# Patient Record
Sex: MALE | Race: BLACK OR AFRICAN AMERICAN | NOT HISPANIC OR LATINO | URBAN - METROPOLITAN AREA
[De-identification: names, ages, dates, MRNs, and addresses within clinical notes are randomized per-mention and may not be internally consistent; named-entity substitution may affect disease eponyms.]

---

## 2023-03-21 LAB
INR IN PPP BY COAGULATION ASSAY: 2.5 (ref 0.9–1.1)
PROTHROMBIN TIME (PT) IN PPP BY COAGULATION ASSAY: 29.7 SEC (ref 9.8–13.4)

## 2023-05-10 LAB
INR IN PPP BY COAGULATION ASSAY: 1.6 (ref 0.9–1.1)
PROTHROMBIN TIME (PT) IN PPP BY COAGULATION ASSAY: 18.7 SEC (ref 9.8–13.4)

## 2023-08-16 LAB
INR IN PPP BY COAGULATION ASSAY: 2.2 (ref 0.9–1.1)
PROTHROMBIN TIME (PT) IN PPP BY COAGULATION ASSAY: 24.6 SEC (ref 9.8–12.8)

## 2023-09-13 LAB
ALANINE AMINOTRANSFERASE (SGPT) (U/L) IN SER/PLAS: 25 U/L (ref 10–52)
ALBUMIN (G/DL) IN SER/PLAS: 3.8 G/DL (ref 3.4–5)
ALKALINE PHOSPHATASE (U/L) IN SER/PLAS: 101 U/L (ref 33–136)
ANION GAP IN SER/PLAS: 20 MMOL/L (ref 10–20)
ASPARTATE AMINOTRANSFERASE (SGOT) (U/L) IN SER/PLAS: 21 U/L (ref 9–39)
BASOPHILS (10*3/UL) IN BLOOD BY AUTOMATED COUNT: 0.05 X10E9/L (ref 0–0.1)
BASOPHILS/100 LEUKOCYTES IN BLOOD BY AUTOMATED COUNT: 0.6 % (ref 0–2)
BILIRUBIN TOTAL (MG/DL) IN SER/PLAS: 0.8 MG/DL (ref 0–1.2)
CALCIDIOL (25 OH VITAMIN D3) (NG/ML) IN SER/PLAS: 70 NG/ML
CALCIUM (MG/DL) IN SER/PLAS: 9.9 MG/DL (ref 8.6–10.6)
CARBON DIOXIDE, TOTAL (MMOL/L) IN SER/PLAS: 21 MMOL/L (ref 21–32)
CHLORIDE (MMOL/L) IN SER/PLAS: 103 MMOL/L (ref 98–107)
CHOLESTEROL (MG/DL) IN SER/PLAS: 99 MG/DL (ref 0–199)
CHOLESTEROL IN HDL (MG/DL) IN SER/PLAS: 32.1 MG/DL
CHOLESTEROL/HDL RATIO: 3.1
COBALAMIN (VITAMIN B12) (PG/ML) IN SER/PLAS: 303 PG/ML (ref 211–911)
CREATININE (MG/DL) IN SER/PLAS: 1.94 MG/DL (ref 0.5–1.3)
EOSINOPHILS (10*3/UL) IN BLOOD BY AUTOMATED COUNT: 0.28 X10E9/L (ref 0–0.7)
EOSINOPHILS/100 LEUKOCYTES IN BLOOD BY AUTOMATED COUNT: 3.4 % (ref 0–6)
ERYTHROCYTE DISTRIBUTION WIDTH (RATIO) BY AUTOMATED COUNT: 13.8 % (ref 11.5–14.5)
ERYTHROCYTE MEAN CORPUSCULAR HEMOGLOBIN CONCENTRATION (G/DL) BY AUTOMATED: 32.2 G/DL (ref 32–36)
ERYTHROCYTE MEAN CORPUSCULAR VOLUME (FL) BY AUTOMATED COUNT: 98 FL (ref 80–100)
ERYTHROCYTES (10*6/UL) IN BLOOD BY AUTOMATED COUNT: 4.43 X10E12/L (ref 4.5–5.9)
ESTIMATED AVERAGE GLUCOSE FOR HBA1C: 160 MG/DL
GFR MALE: 37 ML/MIN/1.73M2
GLUCOSE (MG/DL) IN SER/PLAS: 120 MG/DL (ref 74–99)
HEMATOCRIT (%) IN BLOOD BY AUTOMATED COUNT: 43.2 % (ref 41–52)
HEMOGLOBIN (G/DL) IN BLOOD: 13.9 G/DL (ref 13.5–17.5)
HEMOGLOBIN A1C/HEMOGLOBIN TOTAL IN BLOOD: 7.2 %
IMMATURE GRANULOCYTES/100 LEUKOCYTES IN BLOOD BY AUTOMATED COUNT: 0.2 % (ref 0–0.9)
INR IN PPP BY COAGULATION ASSAY: 2.5 (ref 0.9–1.1)
LDL: 54 MG/DL (ref 0–99)
LEUKOCYTES (10*3/UL) IN BLOOD BY AUTOMATED COUNT: 8.2 X10E9/L (ref 4.4–11.3)
LYMPHOCYTES (10*3/UL) IN BLOOD BY AUTOMATED COUNT: 2.73 X10E9/L (ref 1.2–4.8)
LYMPHOCYTES/100 LEUKOCYTES IN BLOOD BY AUTOMATED COUNT: 33.5 % (ref 13–44)
MONOCYTES (10*3/UL) IN BLOOD BY AUTOMATED COUNT: 0.75 X10E9/L (ref 0.1–1)
MONOCYTES/100 LEUKOCYTES IN BLOOD BY AUTOMATED COUNT: 9.2 % (ref 2–10)
NEUTROPHILS (10*3/UL) IN BLOOD BY AUTOMATED COUNT: 4.32 X10E9/L (ref 1.2–7.7)
NEUTROPHILS/100 LEUKOCYTES IN BLOOD BY AUTOMATED COUNT: 53.1 % (ref 40–80)
NRBC (PER 100 WBCS) BY AUTOMATED COUNT: 0 /100 WBC (ref 0–0)
PLATELETS (10*3/UL) IN BLOOD AUTOMATED COUNT: 223 X10E9/L (ref 150–450)
POTASSIUM (MMOL/L) IN SER/PLAS: 4.5 MMOL/L (ref 3.5–5.3)
PROTEIN TOTAL: 7.2 G/DL (ref 6.4–8.2)
PROTHROMBIN TIME (PT) IN PPP BY COAGULATION ASSAY: 29 SEC (ref 9.8–12.8)
SODIUM (MMOL/L) IN SER/PLAS: 139 MMOL/L (ref 136–145)
THYROTROPIN (MIU/L) IN SER/PLAS BY DETECTION LIMIT <= 0.05 MIU/L: 3.04 MIU/L (ref 0.44–3.98)
TRIGLYCERIDE (MG/DL) IN SER/PLAS: 63 MG/DL (ref 0–149)
URATE (MG/DL) IN SER/PLAS: 9.5 MG/DL (ref 4–7.5)
UREA NITROGEN (MG/DL) IN SER/PLAS: 30 MG/DL (ref 6–23)
VLDL: 13 MG/DL (ref 0–40)

## 2023-11-07 DIAGNOSIS — I25.10 CORONARY ARTERY DISEASE INVOLVING NATIVE CORONARY ARTERY OF NATIVE HEART WITHOUT ANGINA PECTORIS: Primary | ICD-10-CM

## 2023-11-07 RX ORDER — FLUTICASONE PROPIONATE 50 MCG
2 SPRAY, SUSPENSION (ML) NASAL DAILY
COMMUNITY
Start: 2018-07-26

## 2023-11-07 RX ORDER — ALBUTEROL SULFATE 90 UG/1
2 AEROSOL, METERED RESPIRATORY (INHALATION) EVERY 4 HOURS PRN
COMMUNITY

## 2023-11-07 RX ORDER — AMMONIUM LACTATE 12 G/100G
2 LOTION TOPICAL 2 TIMES DAILY
COMMUNITY
Start: 2018-07-26

## 2023-11-07 RX ORDER — GABAPENTIN 300 MG/1
300 CAPSULE ORAL 3 TIMES DAILY
COMMUNITY
Start: 2018-07-26 | End: 2023-12-28 | Stop reason: SDUPTHER

## 2023-11-07 RX ORDER — DOCUSATE SODIUM 100 MG/1
100 CAPSULE, LIQUID FILLED ORAL 2 TIMES DAILY
COMMUNITY
Start: 2018-07-26

## 2023-11-07 RX ORDER — HYDROCHLOROTHIAZIDE 25 MG/1
25 TABLET ORAL DAILY
COMMUNITY
End: 2023-12-28 | Stop reason: ALTCHOICE

## 2023-11-07 RX ORDER — SEMAGLUTIDE 1.34 MG/ML
1 INJECTION, SOLUTION SUBCUTANEOUS
COMMUNITY
Start: 2023-02-07 | End: 2023-12-28 | Stop reason: ALTCHOICE

## 2023-11-07 RX ORDER — EZETIMIBE 10 MG/1
10 TABLET ORAL NIGHTLY
COMMUNITY
End: 2023-12-28 | Stop reason: SDUPTHER

## 2023-11-07 RX ORDER — NITROGLYCERIN 0.4 MG/1
0.4 TABLET SUBLINGUAL EVERY 5 MIN PRN
COMMUNITY
Start: 2018-07-26

## 2023-11-07 RX ORDER — MOMETASONE FUROATE AND FORMOTEROL FUMARATE DIHYDRATE 200; 5 UG/1; UG/1
2 AEROSOL RESPIRATORY (INHALATION) 2 TIMES DAILY
COMMUNITY
Start: 2018-07-26 | End: 2023-12-28 | Stop reason: SDUPTHER

## 2023-11-07 RX ORDER — ATORVASTATIN CALCIUM 40 MG/1
40 TABLET, FILM COATED ORAL DAILY
COMMUNITY
End: 2023-12-28 | Stop reason: SDUPTHER

## 2023-11-07 RX ORDER — ALLOPURINOL 100 MG/1
2 TABLET ORAL DAILY
COMMUNITY
End: 2023-12-28 | Stop reason: SDUPTHER

## 2023-11-07 RX ORDER — ISOSORBIDE MONONITRATE 30 MG/1
30 TABLET, EXTENDED RELEASE ORAL DAILY
COMMUNITY
End: 2023-11-07 | Stop reason: SDUPTHER

## 2023-11-07 RX ORDER — AZELASTINE HCL 205.5 UG/1
SPRAY NASAL
COMMUNITY
Start: 2018-07-26

## 2023-11-07 RX ORDER — AMLODIPINE BESYLATE 5 MG/1
5 TABLET ORAL DAILY
COMMUNITY
Start: 2023-01-28 | End: 2023-12-28 | Stop reason: ALTCHOICE

## 2023-11-07 RX ORDER — BISOPROLOL FUMARATE 5 MG/1
5 TABLET, FILM COATED ORAL DAILY
COMMUNITY
End: 2023-12-28 | Stop reason: SDUPTHER

## 2023-11-07 RX ORDER — IPRATROPIUM BROMIDE AND ALBUTEROL SULFATE 2.5; .5 MG/3ML; MG/3ML
3 SOLUTION RESPIRATORY (INHALATION)
COMMUNITY
Start: 2018-07-26

## 2023-11-07 RX ORDER — WARFARIN SODIUM 5 MG/1
5 TABLET ORAL NIGHTLY
COMMUNITY
Start: 2018-07-26 | End: 2023-12-28 | Stop reason: SDUPTHER

## 2023-11-07 RX ORDER — ALBUTEROL SULFATE 0.83 MG/ML
2.5 SOLUTION RESPIRATORY (INHALATION) EVERY 6 HOURS PRN
COMMUNITY
Start: 2023-08-01

## 2023-11-07 RX ORDER — OMEPRAZOLE 20 MG/1
20 CAPSULE, DELAYED RELEASE ORAL DAILY
COMMUNITY
End: 2023-12-28 | Stop reason: SDUPTHER

## 2023-11-07 RX ORDER — SPIRONOLACTONE 25 MG/1
25 TABLET ORAL DAILY
COMMUNITY
Start: 2023-01-14 | End: 2023-12-28 | Stop reason: ALTCHOICE

## 2023-11-07 RX ORDER — LEVOTHYROXINE SODIUM 50 UG/1
50 TABLET ORAL DAILY
COMMUNITY
End: 2023-12-28 | Stop reason: SDUPTHER

## 2023-11-07 RX ORDER — DULAGLUTIDE 1.5 MG/.5ML
0.75 INJECTION, SOLUTION SUBCUTANEOUS
COMMUNITY
End: 2023-12-28 | Stop reason: SDUPTHER

## 2023-11-07 RX ORDER — LISINOPRIL AND HYDROCHLOROTHIAZIDE 12.5; 2 MG/1; MG/1
1 TABLET ORAL DAILY
COMMUNITY
End: 2023-12-28 | Stop reason: SDUPTHER

## 2023-11-07 RX ORDER — ERGOCALCIFEROL 1.25 MG/1
50000 CAPSULE ORAL
COMMUNITY
Start: 2018-07-26 | End: 2024-05-07 | Stop reason: SDUPTHER

## 2023-11-07 RX ORDER — SEMAGLUTIDE 1.34 MG/ML
0.5 INJECTION, SOLUTION SUBCUTANEOUS
COMMUNITY
Start: 2023-01-26 | End: 2023-12-28 | Stop reason: ALTCHOICE

## 2023-11-08 RX ORDER — ISOSORBIDE MONONITRATE 30 MG/1
30 TABLET, EXTENDED RELEASE ORAL DAILY
Qty: 90 TABLET | Refills: 3 | Status: SHIPPED | OUTPATIENT
Start: 2023-11-08 | End: 2023-12-28 | Stop reason: SDUPTHER

## 2023-12-28 ENCOUNTER — OFFICE VISIT (OUTPATIENT)
Dept: PRIMARY CARE | Facility: CLINIC | Age: 68
End: 2023-12-28
Payer: COMMERCIAL

## 2023-12-28 VITALS
OXYGEN SATURATION: 98 % | DIASTOLIC BLOOD PRESSURE: 70 MMHG | HEART RATE: 60 BPM | HEIGHT: 73 IN | BODY MASS INDEX: 39.71 KG/M2 | SYSTOLIC BLOOD PRESSURE: 102 MMHG

## 2023-12-28 DIAGNOSIS — L25.8 CONTACT DERMATITIS DUE TO OTHER AGENT, UNSPECIFIED CONTACT DERMATITIS TYPE: ICD-10-CM

## 2023-12-28 DIAGNOSIS — M10.9 GOUT, UNSPECIFIED CAUSE, UNSPECIFIED CHRONICITY, UNSPECIFIED SITE: ICD-10-CM

## 2023-12-28 DIAGNOSIS — I10 BENIGN ESSENTIAL HTN: Primary | ICD-10-CM

## 2023-12-28 DIAGNOSIS — E03.9 HYPOTHYROIDISM, UNSPECIFIED TYPE: ICD-10-CM

## 2023-12-28 DIAGNOSIS — I48.92 ATRIAL FLUTTER WITH RAPID VENTRICULAR RESPONSE (MULTI): ICD-10-CM

## 2023-12-28 DIAGNOSIS — K21.9 GASTROESOPHAGEAL REFLUX DISEASE WITHOUT ESOPHAGITIS: ICD-10-CM

## 2023-12-28 DIAGNOSIS — J42 CHRONIC BRONCHITIS, UNSPECIFIED CHRONIC BRONCHITIS TYPE (MULTI): ICD-10-CM

## 2023-12-28 DIAGNOSIS — E78.2 MIXED HYPERLIPIDEMIA: ICD-10-CM

## 2023-12-28 DIAGNOSIS — E11.00 TYPE 2 DIABETES MELLITUS WITH HYPEROSMOLARITY, UNCONTROLLED (MULTI): ICD-10-CM

## 2023-12-28 DIAGNOSIS — I25.10 CORONARY ARTERY DISEASE INVOLVING NATIVE CORONARY ARTERY OF NATIVE HEART WITHOUT ANGINA PECTORIS: ICD-10-CM

## 2023-12-28 PROBLEM — J18.9 PNEUMONIA: Status: ACTIVE | Noted: 2023-06-30

## 2023-12-28 PROBLEM — M16.12 PRIMARY OSTEOARTHRITIS OF LEFT HIP: Status: ACTIVE | Noted: 2023-12-28

## 2023-12-28 PROBLEM — I48.91 AFIB (MULTI): Status: ACTIVE | Noted: 2023-12-28

## 2023-12-28 PROBLEM — M25.552 LEFT HIP PAIN: Status: ACTIVE | Noted: 2023-12-28

## 2023-12-28 PROBLEM — J44.1 COPD WITH ACUTE EXACERBATION (MULTI): Status: ACTIVE | Noted: 2023-06-30

## 2023-12-28 PROBLEM — I73.9 PAD (PERIPHERAL ARTERY DISEASE) (CMS-HCC): Status: ACTIVE | Noted: 2023-12-28

## 2023-12-28 PROBLEM — R31.9 HEMATURIA: Status: ACTIVE | Noted: 2023-12-28

## 2023-12-28 PROBLEM — I97.641 POSTOPERATIVE SEROMA INVOLVING CIRCULATORY SYSTEM AFTER CARDIAC BYPASS: Status: ACTIVE | Noted: 2023-12-28

## 2023-12-28 PROBLEM — K43.2 INCISIONAL HERNIA, WITHOUT OBSTRUCTION OR GANGRENE: Status: ACTIVE | Noted: 2023-12-28

## 2023-12-28 PROBLEM — E66.01 MORBID OBESITY (MULTI): Status: ACTIVE | Noted: 2023-12-28

## 2023-12-28 PROBLEM — A04.72 CLOSTRIDIUM DIFFICILE COLITIS: Status: ACTIVE | Noted: 2023-12-28

## 2023-12-28 PROBLEM — D64.9 ANEMIA: Status: ACTIVE | Noted: 2023-12-28

## 2023-12-28 PROBLEM — M79.675 PAINFUL LEGS AND MOVING TOES OF LEFT FOOT: Status: ACTIVE | Noted: 2023-12-28

## 2023-12-28 PROBLEM — G62.9 PERIPHERAL NEUROPATHY: Status: ACTIVE | Noted: 2023-12-28

## 2023-12-28 PROBLEM — N18.9 CKD (CHRONIC KIDNEY DISEASE): Status: ACTIVE | Noted: 2023-12-28

## 2023-12-28 PROBLEM — N18.30 CKD STAGE 3 SECONDARY TO DIABETES (MULTI): Status: ACTIVE | Noted: 2023-12-28

## 2023-12-28 PROBLEM — E55.9 VITAMIN D DEFICIENCY: Status: ACTIVE | Noted: 2023-12-28

## 2023-12-28 PROBLEM — Z91.199 NONCOMPLIANCE WITH TREATMENT: Status: ACTIVE | Noted: 2023-12-28

## 2023-12-28 PROBLEM — K81.0 ACUTE CHOLECYSTITIS: Status: ACTIVE | Noted: 2023-12-28

## 2023-12-28 PROBLEM — M19.90 OSTEOARTHRITIS: Status: ACTIVE | Noted: 2023-12-28

## 2023-12-28 PROBLEM — E78.5 HYPERLIPIDEMIA: Status: ACTIVE | Noted: 2023-12-28

## 2023-12-28 PROBLEM — I50.9 CONGESTIVE HEART FAILURE (MULTI): Status: ACTIVE | Noted: 2023-12-28

## 2023-12-28 PROBLEM — R91.8 BILATERAL PULMONARY INFILTRATES ON CXR: Status: ACTIVE | Noted: 2023-06-30

## 2023-12-28 PROBLEM — U07.1 COVID-19: Status: ACTIVE | Noted: 2023-12-28

## 2023-12-28 PROBLEM — M79.605 PAINFUL LEGS AND MOVING TOES OF LEFT FOOT: Status: ACTIVE | Noted: 2023-12-28

## 2023-12-28 PROBLEM — M76.899 ENTHESOPATHY OF HIP REGION: Status: ACTIVE | Noted: 2023-12-28

## 2023-12-28 PROBLEM — E11.22 CKD STAGE 3 SECONDARY TO DIABETES (MULTI): Status: ACTIVE | Noted: 2023-12-28

## 2023-12-28 PROBLEM — F17.200 CURRENT EVERY DAY SMOKER: Status: ACTIVE | Noted: 2023-12-28

## 2023-12-28 PROBLEM — R22.30 LUMP ON FINGER: Status: ACTIVE | Noted: 2023-12-28

## 2023-12-28 PROBLEM — B02.9 SHINGLES: Status: ACTIVE | Noted: 2023-12-28

## 2023-12-28 PROBLEM — E11.40 DIABETIC NEUROPATHY (MULTI): Status: ACTIVE | Noted: 2023-12-28

## 2023-12-28 PROBLEM — E11.9 DIABETES (MULTI): Status: ACTIVE | Noted: 2023-12-28

## 2023-12-28 PROBLEM — J06.9 ACUTE URI: Status: ACTIVE | Noted: 2023-12-28

## 2023-12-28 PROBLEM — J44.9 COPD (CHRONIC OBSTRUCTIVE PULMONARY DISEASE) (MULTI): Status: ACTIVE | Noted: 2023-12-28

## 2023-12-28 PROBLEM — R07.9 CHEST PAIN: Status: ACTIVE | Noted: 2023-06-30

## 2023-12-28 PROBLEM — G47.33 OSA AND COPD OVERLAP SYNDROME (MULTI): Status: ACTIVE | Noted: 2023-12-28

## 2023-12-28 PROBLEM — J44.9 OSA AND COPD OVERLAP SYNDROME (MULTI): Status: ACTIVE | Noted: 2023-12-28

## 2023-12-28 PROBLEM — K42.9 UMBILICAL HERNIA: Status: ACTIVE | Noted: 2023-12-28

## 2023-12-28 PROCEDURE — 3051F HG A1C>EQUAL 7.0%<8.0%: CPT | Performed by: STUDENT IN AN ORGANIZED HEALTH CARE EDUCATION/TRAINING PROGRAM

## 2023-12-28 PROCEDURE — 99214 OFFICE O/P EST MOD 30 MIN: CPT | Performed by: STUDENT IN AN ORGANIZED HEALTH CARE EDUCATION/TRAINING PROGRAM

## 2023-12-28 PROCEDURE — 3078F DIAST BP <80 MM HG: CPT | Performed by: STUDENT IN AN ORGANIZED HEALTH CARE EDUCATION/TRAINING PROGRAM

## 2023-12-28 PROCEDURE — 1159F MED LIST DOCD IN RCRD: CPT | Performed by: STUDENT IN AN ORGANIZED HEALTH CARE EDUCATION/TRAINING PROGRAM

## 2023-12-28 PROCEDURE — 4004F PT TOBACCO SCREEN RCVD TLK: CPT | Performed by: STUDENT IN AN ORGANIZED HEALTH CARE EDUCATION/TRAINING PROGRAM

## 2023-12-28 PROCEDURE — 3074F SYST BP LT 130 MM HG: CPT | Performed by: STUDENT IN AN ORGANIZED HEALTH CARE EDUCATION/TRAINING PROGRAM

## 2023-12-28 RX ORDER — LISINOPRIL AND HYDROCHLOROTHIAZIDE 12.5; 2 MG/1; MG/1
1 TABLET ORAL DAILY
Qty: 90 TABLET | Refills: 1 | Status: SHIPPED | OUTPATIENT
Start: 2023-12-28 | End: 2024-05-07 | Stop reason: SDUPTHER

## 2023-12-28 RX ORDER — MOMETASONE FUROATE AND FORMOTEROL FUMARATE DIHYDRATE 200; 5 UG/1; UG/1
2 AEROSOL RESPIRATORY (INHALATION) 2 TIMES DAILY
Qty: 39 G | Refills: 1 | Status: SHIPPED | OUTPATIENT
Start: 2023-12-28 | End: 2024-05-07 | Stop reason: SDUPTHER

## 2023-12-28 RX ORDER — OMEPRAZOLE 20 MG/1
20 CAPSULE, DELAYED RELEASE ORAL DAILY
Qty: 90 CAPSULE | Refills: 1 | Status: SHIPPED | OUTPATIENT
Start: 2023-12-28 | End: 2024-05-07 | Stop reason: SDUPTHER

## 2023-12-28 RX ORDER — GABAPENTIN 300 MG/1
300 CAPSULE ORAL 3 TIMES DAILY
Qty: 270 CAPSULE | Refills: 1 | Status: SHIPPED | OUTPATIENT
Start: 2023-12-28 | End: 2024-05-07 | Stop reason: SDUPTHER

## 2023-12-28 RX ORDER — KETOCONAZOLE 20 MG/G
CREAM TOPICAL 2 TIMES DAILY
Qty: 30 G | Refills: 2 | Status: SHIPPED | OUTPATIENT
Start: 2023-12-28 | End: 2024-01-07

## 2023-12-28 RX ORDER — LEVOTHYROXINE SODIUM 50 UG/1
50 TABLET ORAL DAILY
Qty: 90 TABLET | Refills: 1 | Status: SHIPPED | OUTPATIENT
Start: 2023-12-28 | End: 2024-05-07 | Stop reason: SDUPTHER

## 2023-12-28 RX ORDER — WARFARIN SODIUM 5 MG/1
TABLET ORAL
Qty: 180 TABLET | Refills: 1 | Status: SHIPPED | OUTPATIENT
Start: 2023-12-28 | End: 2024-03-14 | Stop reason: SDUPTHER

## 2023-12-28 RX ORDER — ALLOPURINOL 100 MG/1
200 TABLET ORAL DAILY
Qty: 90 TABLET | Refills: 1 | Status: SHIPPED | OUTPATIENT
Start: 2023-12-28 | End: 2024-05-07

## 2023-12-28 RX ORDER — DULAGLUTIDE 1.5 MG/.5ML
1.5 INJECTION, SOLUTION SUBCUTANEOUS
Qty: 6 ML | Refills: 0 | Status: SHIPPED | OUTPATIENT
Start: 2023-12-28 | End: 2024-05-07

## 2023-12-28 RX ORDER — BISOPROLOL FUMARATE 5 MG/1
5 TABLET, FILM COATED ORAL DAILY
Qty: 90 TABLET | Refills: 1 | Status: SHIPPED | OUTPATIENT
Start: 2023-12-28 | End: 2024-05-07 | Stop reason: SDUPTHER

## 2023-12-28 RX ORDER — ATORVASTATIN CALCIUM 40 MG/1
40 TABLET, FILM COATED ORAL DAILY
Qty: 90 TABLET | Refills: 1 | Status: SHIPPED | OUTPATIENT
Start: 2023-12-28 | End: 2024-05-07 | Stop reason: SDUPTHER

## 2023-12-28 RX ORDER — EZETIMIBE 10 MG/1
10 TABLET ORAL NIGHTLY
Qty: 90 TABLET | Refills: 1 | Status: SHIPPED | OUTPATIENT
Start: 2023-12-28 | End: 2024-05-07 | Stop reason: SDUPTHER

## 2023-12-28 RX ORDER — ISOSORBIDE MONONITRATE 30 MG/1
30 TABLET, EXTENDED RELEASE ORAL DAILY
Qty: 90 TABLET | Refills: 1 | Status: SHIPPED | OUTPATIENT
Start: 2023-12-28 | End: 2024-05-07 | Stop reason: SDUPTHER

## 2023-12-28 RX ORDER — UBIDECARENONE 75 MG
1 CAPSULE ORAL DAILY
COMMUNITY
Start: 2023-09-15 | End: 2024-05-07 | Stop reason: SDUPTHER

## 2023-12-28 ASSESSMENT — ENCOUNTER SYMPTOMS
JOINT SWELLING: 0
WHEEZING: 0
CONSTIPATION: 0
FATIGUE: 0
FREQUENCY: 0
ARTHRALGIAS: 0
COUGH: 0
RHINORRHEA: 0
PALPITATIONS: 0
FACIAL SWELLING: 0
SORE THROAT: 0
FEVER: 0
NAUSEA: 0
ABDOMINAL PAIN: 0
DIZZINESS: 0
SHORTNESS OF BREATH: 0
DYSURIA: 0
VOMITING: 0
CHILLS: 0
DIARRHEA: 0

## 2024-01-30 ENCOUNTER — LAB (OUTPATIENT)
Dept: LAB | Facility: LAB | Age: 69
End: 2024-01-30
Payer: COMMERCIAL

## 2024-01-30 DIAGNOSIS — E11.00 TYPE 2 DIABETES MELLITUS WITH HYPEROSMOLARITY, UNCONTROLLED (MULTI): ICD-10-CM

## 2024-01-30 DIAGNOSIS — I10 BENIGN ESSENTIAL HTN: ICD-10-CM

## 2024-01-30 LAB
ALBUMIN SERPL BCP-MCNC: 3.7 G/DL (ref 3.4–5)
ALP SERPL-CCNC: 107 U/L (ref 33–136)
ALT SERPL W P-5'-P-CCNC: 14 U/L (ref 10–52)
ANION GAP SERPL CALC-SCNC: 19 MMOL/L (ref 10–20)
AST SERPL W P-5'-P-CCNC: 14 U/L (ref 9–39)
BASOPHILS # BLD AUTO: 0.05 X10*3/UL (ref 0–0.1)
BASOPHILS NFR BLD AUTO: 0.6 %
BILIRUB SERPL-MCNC: 0.7 MG/DL (ref 0–1.2)
BUN SERPL-MCNC: 23 MG/DL (ref 6–23)
CALCIUM SERPL-MCNC: 9.3 MG/DL (ref 8.6–10.6)
CHLORIDE SERPL-SCNC: 101 MMOL/L (ref 98–107)
CHOLEST SERPL-MCNC: 114 MG/DL (ref 0–199)
CHOLESTEROL/HDL RATIO: 3.4
CO2 SERPL-SCNC: 20 MMOL/L (ref 21–32)
CREAT SERPL-MCNC: 1.64 MG/DL (ref 0.5–1.3)
EGFRCR SERPLBLD CKD-EPI 2021: 45 ML/MIN/1.73M*2
EOSINOPHIL # BLD AUTO: 0.33 X10*3/UL (ref 0–0.7)
EOSINOPHIL NFR BLD AUTO: 4.1 %
ERYTHROCYTE [DISTWIDTH] IN BLOOD BY AUTOMATED COUNT: 13.6 % (ref 11.5–14.5)
EST. AVERAGE GLUCOSE BLD GHB EST-MCNC: 140 MG/DL
GLUCOSE SERPL-MCNC: 137 MG/DL (ref 74–99)
HBA1C MFR BLD: 6.5 %
HCT VFR BLD AUTO: 39.7 % (ref 41–52)
HDLC SERPL-MCNC: 33.1 MG/DL
HGB BLD-MCNC: 12.7 G/DL (ref 13.5–17.5)
IMM GRANULOCYTES # BLD AUTO: 0.05 X10*3/UL (ref 0–0.7)
IMM GRANULOCYTES NFR BLD AUTO: 0.6 % (ref 0–0.9)
LDLC SERPL CALC-MCNC: 70 MG/DL
LYMPHOCYTES # BLD AUTO: 2.28 X10*3/UL (ref 1.2–4.8)
LYMPHOCYTES NFR BLD AUTO: 28.4 %
MCH RBC QN AUTO: 30.5 PG (ref 26–34)
MCHC RBC AUTO-ENTMCNC: 32 G/DL (ref 32–36)
MCV RBC AUTO: 95 FL (ref 80–100)
MONOCYTES # BLD AUTO: 0.65 X10*3/UL (ref 0.1–1)
MONOCYTES NFR BLD AUTO: 8.1 %
NEUTROPHILS # BLD AUTO: 4.66 X10*3/UL (ref 1.2–7.7)
NEUTROPHILS NFR BLD AUTO: 58.2 %
NON HDL CHOLESTEROL: 81 MG/DL (ref 0–149)
NRBC BLD-RTO: 0 /100 WBCS (ref 0–0)
PLATELET # BLD AUTO: 278 X10*3/UL (ref 150–450)
POTASSIUM SERPL-SCNC: 3.9 MMOL/L (ref 3.5–5.3)
PROT SERPL-MCNC: 7.5 G/DL (ref 6.4–8.2)
RBC # BLD AUTO: 4.16 X10*6/UL (ref 4.5–5.9)
SODIUM SERPL-SCNC: 136 MMOL/L (ref 136–145)
TRIGL SERPL-MCNC: 53 MG/DL (ref 0–149)
TSH SERPL-ACNC: 3.14 MIU/L (ref 0.44–3.98)
VIT B12 SERPL-MCNC: 430 PG/ML (ref 211–911)
VLDL: 11 MG/DL (ref 0–40)
WBC # BLD AUTO: 8 X10*3/UL (ref 4.4–11.3)

## 2024-01-30 PROCEDURE — 80061 LIPID PANEL: CPT

## 2024-01-30 PROCEDURE — 84443 ASSAY THYROID STIM HORMONE: CPT

## 2024-01-30 PROCEDURE — 36415 COLL VENOUS BLD VENIPUNCTURE: CPT

## 2024-01-30 PROCEDURE — 83036 HEMOGLOBIN GLYCOSYLATED A1C: CPT

## 2024-01-30 PROCEDURE — 85025 COMPLETE CBC W/AUTO DIFF WBC: CPT

## 2024-01-30 PROCEDURE — 82607 VITAMIN B-12: CPT

## 2024-01-30 PROCEDURE — 80053 COMPREHEN METABOLIC PANEL: CPT

## 2024-01-31 ENCOUNTER — TELEPHONE (OUTPATIENT)
Dept: PRIMARY CARE | Facility: CLINIC | Age: 69
End: 2024-01-31
Payer: COMMERCIAL

## 2024-01-31 DIAGNOSIS — Z12.11 ENCOUNTER FOR SCREENING FOR MALIGNANT NEOPLASM OF COLON: ICD-10-CM

## 2024-01-31 DIAGNOSIS — A04.72 CLOSTRIDIUM DIFFICILE COLITIS: Primary | ICD-10-CM

## 2024-01-31 NOTE — TELEPHONE ENCOUNTER
----- Message from Tawanda Carrizales DO sent at 1/31/2024 10:50 AM EST -----  Patient's blood work shows improvement of his A1c from 7.2-6.5.  Kidney function is also stable.  He is slightly anemic which could be from the kidney function but if he has not had a colonoscopy in the last 5 to 10 years would recommend follow-up for colonoscopy to make sure he does not have any bleeding polyps.  He could also take iron with a stool softener daily for no more than 2 weeks

## 2024-02-13 ENCOUNTER — TELEPHONE (OUTPATIENT)
Dept: CARDIOLOGY | Facility: CLINIC | Age: 69
End: 2024-02-13
Payer: COMMERCIAL

## 2024-02-13 DIAGNOSIS — I48.20 CHRONIC ATRIAL FIBRILLATION (MULTI): Primary | ICD-10-CM

## 2024-03-14 ENCOUNTER — OFFICE VISIT (OUTPATIENT)
Dept: CARDIOLOGY | Facility: CLINIC | Age: 69
End: 2024-03-14
Payer: COMMERCIAL

## 2024-03-14 VITALS
HEART RATE: 70 BPM | WEIGHT: 304 LBS | DIASTOLIC BLOOD PRESSURE: 84 MMHG | TEMPERATURE: 97.6 F | SYSTOLIC BLOOD PRESSURE: 132 MMHG | BODY MASS INDEX: 40.29 KG/M2 | HEIGHT: 73 IN

## 2024-03-14 DIAGNOSIS — E78.2 MIXED HYPERLIPIDEMIA: ICD-10-CM

## 2024-03-14 DIAGNOSIS — J06.9 VIRAL UPPER RESPIRATORY TRACT INFECTION: ICD-10-CM

## 2024-03-14 DIAGNOSIS — I48.20 CHRONIC ATRIAL FIBRILLATION (MULTI): Primary | ICD-10-CM

## 2024-03-14 DIAGNOSIS — I48.92 ATRIAL FLUTTER WITH RAPID VENTRICULAR RESPONSE (MULTI): ICD-10-CM

## 2024-03-14 DIAGNOSIS — I10 BENIGN ESSENTIAL HTN: ICD-10-CM

## 2024-03-14 PROCEDURE — 3044F HG A1C LEVEL LT 7.0%: CPT | Performed by: INTERNAL MEDICINE

## 2024-03-14 PROCEDURE — 3079F DIAST BP 80-89 MM HG: CPT | Performed by: INTERNAL MEDICINE

## 2024-03-14 PROCEDURE — 99214 OFFICE O/P EST MOD 30 MIN: CPT | Performed by: INTERNAL MEDICINE

## 2024-03-14 PROCEDURE — 3048F LDL-C <100 MG/DL: CPT | Performed by: INTERNAL MEDICINE

## 2024-03-14 PROCEDURE — 3075F SYST BP GE 130 - 139MM HG: CPT | Performed by: INTERNAL MEDICINE

## 2024-03-14 PROCEDURE — 1125F AMNT PAIN NOTED PAIN PRSNT: CPT | Performed by: INTERNAL MEDICINE

## 2024-03-14 RX ORDER — WARFARIN SODIUM 5 MG/1
TABLET ORAL
Qty: 180 TABLET | Refills: 1 | Status: SHIPPED | OUTPATIENT
Start: 2024-03-14

## 2024-03-14 RX ORDER — AZITHROMYCIN 500 MG/1
500 TABLET, FILM COATED ORAL DAILY
Qty: 3 TABLET | Refills: 0 | Status: SHIPPED | OUTPATIENT
Start: 2024-03-14 | End: 2024-03-17

## 2024-03-14 RX ORDER — TOBRAMYCIN 3 MG/ML
2 SOLUTION/ DROPS OPHTHALMIC EVERY 4 HOURS
COMMUNITY
Start: 2018-07-26

## 2024-03-14 ASSESSMENT — PAIN SCALES - GENERAL: PAINLEVEL: 10-WORST PAIN EVER

## 2024-03-14 NOTE — PROGRESS NOTES
1. Paroxysmal atrial fibrillation [id number] 5  2. Morbid obesity  3. Hypertension  4. Hyperlipidemia  5. Diabetes mellitus  6. Obstructive sleep apnea, CPAP  7. CHF  8. Active smoker  9. PAD, right toe amputation, diabetes mellitus    Patient is 68-year-old male with the above-noted pertinent past medical history who presents today for follow-up, he states that for the past several days he has been suffering from an upper respiratory tract infection he denies any fevers or chills, he states that he has a productive cough clear sputum, he has no complaints of orthopnea PND palpitation or syncopal episode, he continues to be an active smoker of less than 1 pack/day.    BMI 40.1, blood pressure 132/84 mmHg and a heart rate of 70 bpm, elderly male in no acute distress speaking full sentences, no carotid bruits, heart rate and rhythm irregular S1 variable S2 is normal lungs decreased breath sound but clear, abdomen is morbidly obese positive bowel sounds soft and nontender    Atrial fibrillation [id number] score 5 noncompliant with Coumadin follow-up the importance of regular follow-up was discussed and details was provided interaction with medication including antibiotic was discussed follow-up with INR measurements was noted to the patient, patient with peripheral arterial disease right toe amputation due to his diabetes the importance smoking cessation was discussed and recommended  Hypertension under good control  Overweight BMI of over 40 heart healthy diet and weight loss recommended  Noncompliance with medical advice regular follow-up for his office visit and Coumadin follow-ups was discussed with the patient.  Return to my clinic in 6 months or sooner as necessary by symptoms.

## 2024-03-19 ENCOUNTER — APPOINTMENT (OUTPATIENT)
Dept: PRIMARY CARE | Facility: CLINIC | Age: 69
End: 2024-03-19
Payer: COMMERCIAL

## 2024-04-10 ENCOUNTER — TELEPHONE (OUTPATIENT)
Dept: CARDIOLOGY | Facility: CLINIC | Age: 69
End: 2024-04-10
Payer: COMMERCIAL

## 2024-04-10 ENCOUNTER — ANTICOAGULATION - WARFARIN VISIT (OUTPATIENT)
Dept: CARDIOLOGY | Facility: CLINIC | Age: 69
End: 2024-04-10
Payer: COMMERCIAL

## 2024-04-10 DIAGNOSIS — I48.20 CHRONIC ATRIAL FIBRILLATION (MULTI): Primary | ICD-10-CM

## 2024-04-10 LAB
INR IN PPP BY COAGULATION ASSAY EXTERNAL: 3.7 (ref 2–3)
NON-UH HIE INR: 3.7
NON-UH HIE PROTIME PATIENT: 42.3 SECONDS (ref 9.8–12.8)
PROTHROMBIN TIME (PT) IN PPP BY COAGULATION ASSAY EXTERNAL: ABNORMAL SECONDS

## 2024-04-11 ENCOUNTER — TELEPHONE (OUTPATIENT)
Dept: PRIMARY CARE | Facility: CLINIC | Age: 69
End: 2024-04-11
Payer: COMMERCIAL

## 2024-04-18 ENCOUNTER — APPOINTMENT (OUTPATIENT)
Dept: PRIMARY CARE | Facility: CLINIC | Age: 69
End: 2024-04-18
Payer: COMMERCIAL

## 2024-04-18 PROBLEM — M25.552 PAIN IN LEFT HIP: Status: ACTIVE | Noted: 2023-06-22

## 2024-04-18 PROBLEM — G62.9 POLYNEUROPATHY, UNSPECIFIED: Status: ACTIVE | Noted: 2019-01-09

## 2024-04-18 PROBLEM — Z86.79 HISTORY OF ATRIAL FIBRILLATION: Status: ACTIVE | Noted: 2024-04-18

## 2024-04-18 PROBLEM — R39.16 STRAINING TO VOID: Status: ACTIVE | Noted: 2018-05-17

## 2024-04-18 PROBLEM — E11.65 TYPE 2 DIABETES MELLITUS WITH HYPERGLYCEMIA (MULTI): Status: ACTIVE | Noted: 2019-03-29

## 2024-04-18 PROBLEM — M10.9 GOUT: Status: ACTIVE | Noted: 2024-04-18

## 2024-04-18 PROBLEM — E03.9 ACQUIRED HYPOTHYROIDISM: Status: ACTIVE | Noted: 2024-04-18

## 2024-04-18 PROBLEM — L25.9 CONTACT DERMATITIS: Status: ACTIVE | Noted: 2024-04-18

## 2024-05-07 ENCOUNTER — TELEPHONE (OUTPATIENT)
Dept: PRIMARY CARE | Facility: CLINIC | Age: 69
End: 2024-05-07

## 2024-05-07 ENCOUNTER — OFFICE VISIT (OUTPATIENT)
Dept: PRIMARY CARE | Facility: CLINIC | Age: 69
End: 2024-05-07
Payer: COMMERCIAL

## 2024-05-07 VITALS — TEMPERATURE: 98 F | DIASTOLIC BLOOD PRESSURE: 73 MMHG | SYSTOLIC BLOOD PRESSURE: 106 MMHG | HEART RATE: 66 BPM

## 2024-05-07 DIAGNOSIS — K21.9 GASTROESOPHAGEAL REFLUX DISEASE WITHOUT ESOPHAGITIS: ICD-10-CM

## 2024-05-07 DIAGNOSIS — Z87.891 ENCOUNTER FOR SCREENING FOR ABDOMINAL AORTIC ANEURYSM (AAA) IN PATIENT 50 YEARS OF AGE OR OLDER WITH HISTORY OF SMOKING: ICD-10-CM

## 2024-05-07 DIAGNOSIS — Z12.5 PROSTATE CANCER SCREENING: ICD-10-CM

## 2024-05-07 DIAGNOSIS — F17.200 CURRENT EVERY DAY SMOKER: ICD-10-CM

## 2024-05-07 DIAGNOSIS — E55.9 VITAMIN D DEFICIENCY: ICD-10-CM

## 2024-05-07 DIAGNOSIS — M1A.9XX0 CHRONIC GOUT WITHOUT TOPHUS, UNSPECIFIED CAUSE, UNSPECIFIED SITE: ICD-10-CM

## 2024-05-07 DIAGNOSIS — E03.9 HYPOTHYROIDISM, UNSPECIFIED TYPE: ICD-10-CM

## 2024-05-07 DIAGNOSIS — I10 BENIGN ESSENTIAL HTN: ICD-10-CM

## 2024-05-07 DIAGNOSIS — E78.2 MIXED HYPERLIPIDEMIA: ICD-10-CM

## 2024-05-07 DIAGNOSIS — E11.00 TYPE 2 DIABETES MELLITUS WITH HYPEROSMOLARITY, UNCONTROLLED (MULTI): ICD-10-CM

## 2024-05-07 DIAGNOSIS — I25.10 CORONARY ARTERY DISEASE INVOLVING NATIVE CORONARY ARTERY OF NATIVE HEART WITHOUT ANGINA PECTORIS: ICD-10-CM

## 2024-05-07 DIAGNOSIS — J42 CHRONIC BRONCHITIS, UNSPECIFIED CHRONIC BRONCHITIS TYPE (MULTI): ICD-10-CM

## 2024-05-07 DIAGNOSIS — E11.65 TYPE 2 DIABETES MELLITUS WITH HYPERGLYCEMIA, WITHOUT LONG-TERM CURRENT USE OF INSULIN (MULTI): Primary | ICD-10-CM

## 2024-05-07 DIAGNOSIS — R79.89 LOW VITAMIN B12 LEVEL: ICD-10-CM

## 2024-05-07 DIAGNOSIS — Z13.6 ENCOUNTER FOR SCREENING FOR ABDOMINAL AORTIC ANEURYSM (AAA) IN PATIENT 50 YEARS OF AGE OR OLDER WITH HISTORY OF SMOKING: ICD-10-CM

## 2024-05-07 DIAGNOSIS — D64.9 ANEMIA, UNSPECIFIED TYPE: ICD-10-CM

## 2024-05-07 PROCEDURE — 99214 OFFICE O/P EST MOD 30 MIN: CPT | Performed by: INTERNAL MEDICINE

## 2024-05-07 PROCEDURE — 3044F HG A1C LEVEL LT 7.0%: CPT | Performed by: INTERNAL MEDICINE

## 2024-05-07 PROCEDURE — 3078F DIAST BP <80 MM HG: CPT | Performed by: INTERNAL MEDICINE

## 2024-05-07 PROCEDURE — 1159F MED LIST DOCD IN RCRD: CPT | Performed by: INTERNAL MEDICINE

## 2024-05-07 PROCEDURE — 3074F SYST BP LT 130 MM HG: CPT | Performed by: INTERNAL MEDICINE

## 2024-05-07 PROCEDURE — 3048F LDL-C <100 MG/DL: CPT | Performed by: INTERNAL MEDICINE

## 2024-05-07 PROCEDURE — 1160F RVW MEDS BY RX/DR IN RCRD: CPT | Performed by: INTERNAL MEDICINE

## 2024-05-07 RX ORDER — LISINOPRIL AND HYDROCHLOROTHIAZIDE 12.5; 2 MG/1; MG/1
1 TABLET ORAL DAILY
Qty: 90 TABLET | Refills: 1 | Status: SHIPPED | OUTPATIENT
Start: 2024-05-07

## 2024-05-07 RX ORDER — ATORVASTATIN CALCIUM 40 MG/1
40 TABLET, FILM COATED ORAL DAILY
Qty: 90 TABLET | Refills: 1 | Status: SHIPPED | OUTPATIENT
Start: 2024-05-07

## 2024-05-07 RX ORDER — FAMOTIDINE 40 MG/1
40 TABLET, FILM COATED ORAL NIGHTLY
COMMUNITY
Start: 2024-05-01

## 2024-05-07 RX ORDER — MOMETASONE FUROATE AND FORMOTEROL FUMARATE DIHYDRATE 200; 5 UG/1; UG/1
2 AEROSOL RESPIRATORY (INHALATION) 2 TIMES DAILY
Qty: 39 G | Refills: 1 | Status: SHIPPED | OUTPATIENT
Start: 2024-05-07 | End: 2024-08-05

## 2024-05-07 RX ORDER — ISOSORBIDE MONONITRATE 30 MG/1
30 TABLET, EXTENDED RELEASE ORAL DAILY
Qty: 90 TABLET | Refills: 1 | Status: SHIPPED | OUTPATIENT
Start: 2024-05-07

## 2024-05-07 RX ORDER — ALLOPURINOL 300 MG/1
300 TABLET ORAL DAILY
Qty: 90 TABLET | Refills: 1 | Status: SHIPPED | OUTPATIENT
Start: 2024-05-07 | End: 2024-11-03

## 2024-05-07 RX ORDER — ERGOCALCIFEROL 1.25 MG/1
50000 CAPSULE ORAL
Qty: 12 CAPSULE | Refills: 1 | Status: SHIPPED | OUTPATIENT
Start: 2024-05-07 | End: 2024-11-03

## 2024-05-07 RX ORDER — UBIDECARENONE 75 MG
500 CAPSULE ORAL DAILY
Qty: 90 TABLET | Refills: 1 | Status: SHIPPED | OUTPATIENT
Start: 2024-05-07 | End: 2024-11-03

## 2024-05-07 RX ORDER — EZETIMIBE 10 MG/1
10 TABLET ORAL NIGHTLY
Qty: 90 TABLET | Refills: 1 | Status: SHIPPED | OUTPATIENT
Start: 2024-05-07

## 2024-05-07 RX ORDER — BISOPROLOL FUMARATE 5 MG/1
5 TABLET, FILM COATED ORAL DAILY
Qty: 90 TABLET | Refills: 1 | Status: SHIPPED | OUTPATIENT
Start: 2024-05-07

## 2024-05-07 RX ORDER — OMEPRAZOLE 20 MG/1
20 CAPSULE, DELAYED RELEASE ORAL DAILY
Qty: 90 CAPSULE | Refills: 1 | Status: SHIPPED | OUTPATIENT
Start: 2024-05-07

## 2024-05-07 RX ORDER — GABAPENTIN 300 MG/1
300 CAPSULE ORAL 3 TIMES DAILY
Qty: 270 CAPSULE | Refills: 1 | Status: SHIPPED | OUTPATIENT
Start: 2024-05-07

## 2024-05-07 RX ORDER — LEVOTHYROXINE SODIUM 50 UG/1
50 TABLET ORAL DAILY
Qty: 90 TABLET | Refills: 1 | Status: SHIPPED | OUTPATIENT
Start: 2024-05-07

## 2024-05-07 RX ORDER — POLYETHYLENE GLYCOL-3350 AND ELECTROLYTES 236; 6.74; 5.86; 2.97; 22.74 G/274.31G; G/274.31G; G/274.31G; G/274.31G; G/274.31G
POWDER, FOR SOLUTION ORAL
COMMUNITY
Start: 2024-03-22

## 2024-05-07 ASSESSMENT — ENCOUNTER SYMPTOMS
OCCASIONAL FEELINGS OF UNSTEADINESS: 1
LOSS OF SENSATION IN FEET: 1
DEPRESSION: 0

## 2024-05-07 ASSESSMENT — PATIENT HEALTH QUESTIONNAIRE - PHQ9
2. FEELING DOWN, DEPRESSED OR HOPELESS: NOT AT ALL
1. LITTLE INTEREST OR PLEASURE IN DOING THINGS: NOT AT ALL
SUM OF ALL RESPONSES TO PHQ9 QUESTIONS 1 AND 2: 0

## 2024-05-08 DIAGNOSIS — R26.2 DIFFICULTY WALKING: ICD-10-CM

## 2024-05-08 DIAGNOSIS — M16.12 PRIMARY OSTEOARTHRITIS OF LEFT HIP: Primary | ICD-10-CM

## 2024-05-24 ENCOUNTER — TELEPHONE (OUTPATIENT)
Dept: PRIMARY CARE | Facility: CLINIC | Age: 69
End: 2024-05-24
Payer: COMMERCIAL

## 2024-05-24 DIAGNOSIS — G62.9 POLYNEUROPATHY, UNSPECIFIED: ICD-10-CM

## 2024-05-24 DIAGNOSIS — M16.12 PRIMARY OSTEOARTHRITIS OF LEFT HIP: Primary | ICD-10-CM

## 2024-05-24 NOTE — TELEPHONE ENCOUNTER
Patients wife called the order for the walker needs to be the order that states walker with seat. Please refax to number. (See note)

## 2024-05-31 PROBLEM — E03.9 HYPOTHYROIDISM: Status: ACTIVE | Noted: 2024-05-31

## 2024-05-31 PROBLEM — Z12.5 PROSTATE CANCER SCREENING: Status: ACTIVE | Noted: 2024-05-31

## 2024-05-31 PROBLEM — Z87.891 ENCOUNTER FOR SCREENING FOR ABDOMINAL AORTIC ANEURYSM (AAA) IN PATIENT 50 YEARS OF AGE OR OLDER WITH HISTORY OF SMOKING: Status: ACTIVE | Noted: 2024-05-31

## 2024-05-31 PROBLEM — R79.89 LOW VITAMIN B12 LEVEL: Status: ACTIVE | Noted: 2024-05-31

## 2024-05-31 PROBLEM — Z13.6 ENCOUNTER FOR SCREENING FOR ABDOMINAL AORTIC ANEURYSM (AAA) IN PATIENT 50 YEARS OF AGE OR OLDER WITH HISTORY OF SMOKING: Status: ACTIVE | Noted: 2024-05-31

## 2024-06-01 NOTE — PROGRESS NOTES
Subjective   Patient ID: Rufino Obrien is a 68 y.o. male who presents for Establish Care and Med Refill (All meds, /Asked for increase dose of trulicity. ).    HPI     Review of Systems    Objective   /73   Pulse 66   Temp 36.7 °C (98 °F) (Temporal)     Physical Exam    Assessment/Plan   Problem List Items Addressed This Visit             ICD-10-CM    Anemia D64.9    Relevant Orders    CBC and Auto Differential    Ferritin    Iron and TIBC    Benign essential HTN I10    Relevant Medications    lisinopriL-hydrochlorothiazide 20-12.5 mg tablet    bisoprolol (Zebeta) 5 mg tablet    Other Relevant Orders    Comprehensive metabolic panel    Vascular US abdominal aorta anuerysm AAA screening    CAD (coronary artery disease) I25.10    Relevant Medications    isosorbide mononitrate ER (Imdur) 30 mg 24 hr tablet    bisoprolol (Zebeta) 5 mg tablet    COPD (chronic obstructive pulmonary disease) (Multi) J44.9    Relevant Medications    mometasone-formoterol (Dulera) 200-5 mcg/actuation inhaler    Current every day smoker F17.200    Relevant Orders    Vascular US abdominal aorta anuerysm AAA screening    Gastroesophageal reflux disease without esophagitis K21.9    Relevant Medications    omeprazole (PriLOSEC) 20 mg DR capsule    Hyperlipidemia E78.5    Relevant Medications    ezetimibe (Zetia) 10 mg tablet    atorvastatin (Lipitor) 40 mg tablet    Other Relevant Orders    Comprehensive metabolic panel    Vitamin D deficiency E55.9    Relevant Medications    ergocalciferol (Vitamin D-2) 1.25 MG (66789 UT) capsule    Other Relevant Orders    Vitamin D 25-Hydroxy,Total (for eval of Vitamin D levels)    Type 2 diabetes mellitus with hyperosmolarity, uncontrolled (Multi) E11.00    Relevant Medications    gabapentin (Neurontin) 300 mg capsule    Gout M10.9    Relevant Medications    allopurinol (Zyloprim) 300 mg tablet    Other Relevant Orders    Uric acid    Type 2 diabetes mellitus with hyperglycemia (Multi) - Primary  E11.65    Relevant Medications    dulaglutide 3 mg/0.5 mL pen injector    Other Relevant Orders    Comprehensive metabolic panel    Hemoglobin A1C    Albumin , Urine Random    Hypothyroidism E03.9    Relevant Medications    levothyroxine (Synthroid, Levoxyl) 50 mcg tablet    Prostate cancer screening Z12.5    Relevant Orders    Prostate Spec.Ag,Screen    Low vitamin B12 level R79.89    Relevant Medications    cyanocobalamin (Vitamin B-12) 500 mcg tablet    Encounter for screening for abdominal aortic aneurysm (AAA) in patient 50 years of age or older with history of smoking Z13.6, Z87.891    Relevant Orders    Vascular US abdominal aorta anuerysm AAA screening

## 2024-06-27 ENCOUNTER — APPOINTMENT (OUTPATIENT)
Dept: PRIMARY CARE | Facility: CLINIC | Age: 69
End: 2024-06-27
Payer: COMMERCIAL

## 2024-08-08 ENCOUNTER — APPOINTMENT (OUTPATIENT)
Dept: PRIMARY CARE | Facility: CLINIC | Age: 69
End: 2024-08-08
Payer: COMMERCIAL

## 2024-08-19 ENCOUNTER — TELEPHONE (OUTPATIENT)
Dept: CARDIOLOGY | Facility: CLINIC | Age: 69
End: 2024-08-19
Payer: COMMERCIAL

## 2024-08-27 ENCOUNTER — TELEPHONE (OUTPATIENT)
Dept: CARDIOLOGY | Facility: CLINIC | Age: 69
End: 2024-08-27
Payer: COMMERCIAL

## 2024-09-12 ENCOUNTER — APPOINTMENT (OUTPATIENT)
Dept: CARDIOLOGY | Facility: CLINIC | Age: 69
End: 2024-09-12
Payer: COMMERCIAL

## 2024-09-12 VITALS
SYSTOLIC BLOOD PRESSURE: 128 MMHG | BODY MASS INDEX: 36.58 KG/M2 | TEMPERATURE: 96.6 F | HEART RATE: 87 BPM | WEIGHT: 276 LBS | HEIGHT: 73 IN | DIASTOLIC BLOOD PRESSURE: 80 MMHG

## 2024-09-12 DIAGNOSIS — E78.2 MIXED HYPERLIPIDEMIA: ICD-10-CM

## 2024-09-12 DIAGNOSIS — I48.21 PERMANENT ATRIAL FIBRILLATION (MULTI): Primary | ICD-10-CM

## 2024-09-12 DIAGNOSIS — I73.9 PAD (PERIPHERAL ARTERY DISEASE) (CMS-HCC): ICD-10-CM

## 2024-09-12 DIAGNOSIS — I10 BENIGN ESSENTIAL HTN: ICD-10-CM

## 2024-09-12 PROCEDURE — 3044F HG A1C LEVEL LT 7.0%: CPT | Performed by: INTERNAL MEDICINE

## 2024-09-12 PROCEDURE — 3008F BODY MASS INDEX DOCD: CPT | Performed by: INTERNAL MEDICINE

## 2024-09-12 PROCEDURE — 4004F PT TOBACCO SCREEN RCVD TLK: CPT | Performed by: INTERNAL MEDICINE

## 2024-09-12 PROCEDURE — 99213 OFFICE O/P EST LOW 20 MIN: CPT | Performed by: INTERNAL MEDICINE

## 2024-09-12 PROCEDURE — 1126F AMNT PAIN NOTED NONE PRSNT: CPT | Performed by: INTERNAL MEDICINE

## 2024-09-12 PROCEDURE — 3048F LDL-C <100 MG/DL: CPT | Performed by: INTERNAL MEDICINE

## 2024-09-12 PROCEDURE — 3079F DIAST BP 80-89 MM HG: CPT | Performed by: INTERNAL MEDICINE

## 2024-09-12 PROCEDURE — 1159F MED LIST DOCD IN RCRD: CPT | Performed by: INTERNAL MEDICINE

## 2024-09-12 PROCEDURE — 3074F SYST BP LT 130 MM HG: CPT | Performed by: INTERNAL MEDICINE

## 2024-09-12 ASSESSMENT — PAIN SCALES - GENERAL: PAINLEVEL: 0-NO PAIN

## 2024-09-12 NOTE — PROGRESS NOTES
1. Paroxysmal atrial fibrillation [id number] 5  2. Morbid obesity  3. Hypertension  4. Hyperlipidemia  5. Diabetes mellitus  6. Obstructive sleep apnea, CPAP  7. CHF  8. Active smoker  9. PAD, right toe amputation, diabetes mellitus    Patient is a pleasant 69-year-old male with the above-noted pertinent past medical history who presents today for follow-up, he has history of permanent atrial fibrillation [id number] score of 5, he states that he has difficulty ambulating due to his arthritic hip and unable to routinely go to the lab for INR checks, he has had peripheral vascular disease due to his diabetes and toe amputation yet he continues to have smoking of less than 1 pack/day he denies any chest pains or shortness of breath he has no complaints of orthopnea PND palpitation or syncopal episode.    Weight 276 pounds, heart rate of 87 bpm blood pressure 128/80 mmHg elderly male in no acute distress speaking full sentences no carotid bruits heart rate and rhythm irregular S1 variable S2 is normal no gallop murmurs, lungs decreased breath sound but clear abdomen is obese positive bowel sounds soft and nontender    January 2024  Total cholesterol 114, triglyceride 53, HDL 33, and LDL of 70    Atrial fibrillation with noncompliant with INR management several options was discussed with the patient patient states that DOAC's is out of his price range, home INR monitoring device was discussed and recommendation for obtaining one was made, patient has had significant weight loss that will change his Coumadin requirements  Blood pressure under better control  Hyperlipidemia with low HDL smoking cessation was discussed and recommended  Patient is to return to cardiology in 6 months.

## 2024-10-10 ENCOUNTER — APPOINTMENT (OUTPATIENT)
Dept: PRIMARY CARE | Facility: CLINIC | Age: 69
End: 2024-10-10
Payer: COMMERCIAL

## 2024-10-14 ENCOUNTER — APPOINTMENT (OUTPATIENT)
Dept: PRIMARY CARE | Facility: CLINIC | Age: 69
End: 2024-10-14
Payer: COMMERCIAL

## 2024-10-14 VITALS
DIASTOLIC BLOOD PRESSURE: 86 MMHG | HEIGHT: 73 IN | SYSTOLIC BLOOD PRESSURE: 134 MMHG | WEIGHT: 305.2 LBS | BODY MASS INDEX: 40.45 KG/M2 | TEMPERATURE: 97.9 F | HEART RATE: 89 BPM

## 2024-10-14 DIAGNOSIS — K59.00 CONSTIPATION, UNSPECIFIED CONSTIPATION TYPE: ICD-10-CM

## 2024-10-14 DIAGNOSIS — E55.9 VITAMIN D DEFICIENCY: ICD-10-CM

## 2024-10-14 DIAGNOSIS — M16.12 PRIMARY OSTEOARTHRITIS OF LEFT HIP: ICD-10-CM

## 2024-10-14 DIAGNOSIS — R79.89 LOW VITAMIN B12 LEVEL: ICD-10-CM

## 2024-10-14 DIAGNOSIS — L29.9 ITCHING: ICD-10-CM

## 2024-10-14 DIAGNOSIS — K21.9 GASTROESOPHAGEAL REFLUX DISEASE WITHOUT ESOPHAGITIS: ICD-10-CM

## 2024-10-14 DIAGNOSIS — I48.92 ATRIAL FLUTTER WITH RAPID VENTRICULAR RESPONSE (MULTI): ICD-10-CM

## 2024-10-14 DIAGNOSIS — Z00.00 HEALTH MAINTENANCE EXAMINATION: Primary | ICD-10-CM

## 2024-10-14 DIAGNOSIS — E11.65 TYPE 2 DIABETES MELLITUS WITH HYPERGLYCEMIA, WITHOUT LONG-TERM CURRENT USE OF INSULIN: ICD-10-CM

## 2024-10-14 DIAGNOSIS — M1A.9XX0 CHRONIC GOUT WITHOUT TOPHUS, UNSPECIFIED CAUSE, UNSPECIFIED SITE: ICD-10-CM

## 2024-10-14 DIAGNOSIS — Z23 NEED FOR INFLUENZA VACCINATION: ICD-10-CM

## 2024-10-14 DIAGNOSIS — E03.9 HYPOTHYROIDISM, UNSPECIFIED TYPE: ICD-10-CM

## 2024-10-14 DIAGNOSIS — I10 BENIGN ESSENTIAL HTN: ICD-10-CM

## 2024-10-14 DIAGNOSIS — I25.10 CORONARY ARTERY DISEASE INVOLVING NATIVE CORONARY ARTERY OF NATIVE HEART WITHOUT ANGINA PECTORIS: ICD-10-CM

## 2024-10-14 DIAGNOSIS — E11.00 TYPE 2 DIABETES MELLITUS WITH HYPEROSMOLARITY, UNCONTROLLED: ICD-10-CM

## 2024-10-14 DIAGNOSIS — E78.2 MIXED HYPERLIPIDEMIA: ICD-10-CM

## 2024-10-14 DIAGNOSIS — J42 CHRONIC BRONCHITIS, UNSPECIFIED CHRONIC BRONCHITIS TYPE (MULTI): ICD-10-CM

## 2024-10-14 PROCEDURE — 1123F ACP DISCUSS/DSCN MKR DOCD: CPT | Performed by: INTERNAL MEDICINE

## 2024-10-14 PROCEDURE — 1158F ADVNC CARE PLAN TLK DOCD: CPT | Performed by: INTERNAL MEDICINE

## 2024-10-14 PROCEDURE — 99214 OFFICE O/P EST MOD 30 MIN: CPT | Performed by: INTERNAL MEDICINE

## 2024-10-14 PROCEDURE — 1160F RVW MEDS BY RX/DR IN RCRD: CPT | Performed by: INTERNAL MEDICINE

## 2024-10-14 PROCEDURE — 3048F LDL-C <100 MG/DL: CPT | Performed by: INTERNAL MEDICINE

## 2024-10-14 PROCEDURE — 1159F MED LIST DOCD IN RCRD: CPT | Performed by: INTERNAL MEDICINE

## 2024-10-14 PROCEDURE — 3079F DIAST BP 80-89 MM HG: CPT | Performed by: INTERNAL MEDICINE

## 2024-10-14 PROCEDURE — 3008F BODY MASS INDEX DOCD: CPT | Performed by: INTERNAL MEDICINE

## 2024-10-14 PROCEDURE — 1170F FXNL STATUS ASSESSED: CPT | Performed by: INTERNAL MEDICINE

## 2024-10-14 PROCEDURE — 90471 IMMUNIZATION ADMIN: CPT | Performed by: INTERNAL MEDICINE

## 2024-10-14 PROCEDURE — 99397 PER PM REEVAL EST PAT 65+ YR: CPT | Performed by: INTERNAL MEDICINE

## 2024-10-14 PROCEDURE — 4004F PT TOBACCO SCREEN RCVD TLK: CPT | Performed by: INTERNAL MEDICINE

## 2024-10-14 PROCEDURE — 3075F SYST BP GE 130 - 139MM HG: CPT | Performed by: INTERNAL MEDICINE

## 2024-10-14 PROCEDURE — 90662 IIV NO PRSV INCREASED AG IM: CPT | Performed by: INTERNAL MEDICINE

## 2024-10-14 PROCEDURE — 3044F HG A1C LEVEL LT 7.0%: CPT | Performed by: INTERNAL MEDICINE

## 2024-10-14 RX ORDER — DOCUSATE SODIUM 100 MG/1
100 CAPSULE, LIQUID FILLED ORAL 2 TIMES DAILY
Qty: 180 CAPSULE | Refills: 0 | Status: SHIPPED | OUTPATIENT
Start: 2024-10-14 | End: 2025-01-12

## 2024-10-14 RX ORDER — ALLOPURINOL 300 MG/1
300 TABLET ORAL DAILY
Qty: 90 TABLET | Refills: 1 | Status: SHIPPED | OUTPATIENT
Start: 2024-10-14 | End: 2025-04-12

## 2024-10-14 RX ORDER — KETOCONAZOLE 20 MG/G
CREAM TOPICAL 2 TIMES DAILY
Qty: 60 G | Refills: 2 | Status: SHIPPED | OUTPATIENT
Start: 2024-10-14 | End: 2024-10-28

## 2024-10-14 RX ORDER — GABAPENTIN 300 MG/1
300 CAPSULE ORAL 3 TIMES DAILY
Qty: 270 CAPSULE | Refills: 1 | Status: SHIPPED | OUTPATIENT
Start: 2024-10-14

## 2024-10-14 RX ORDER — OMEPRAZOLE 20 MG/1
20 CAPSULE, DELAYED RELEASE ORAL DAILY
Qty: 90 CAPSULE | Refills: 1 | Status: SHIPPED | OUTPATIENT
Start: 2024-10-14

## 2024-10-14 RX ORDER — LISINOPRIL AND HYDROCHLOROTHIAZIDE 12.5; 2 MG/1; MG/1
1 TABLET ORAL DAILY
Qty: 90 TABLET | Refills: 1 | Status: SHIPPED | OUTPATIENT
Start: 2024-10-14

## 2024-10-14 RX ORDER — HYDROCHLOROTHIAZIDE 25 MG/1
25 TABLET ORAL DAILY
COMMUNITY
End: 2024-10-14 | Stop reason: ALTCHOICE

## 2024-10-14 RX ORDER — ISOSORBIDE MONONITRATE 30 MG/1
30 TABLET, EXTENDED RELEASE ORAL DAILY
Qty: 90 TABLET | Refills: 1 | Status: SHIPPED | OUTPATIENT
Start: 2024-10-14

## 2024-10-14 RX ORDER — LEVOTHYROXINE SODIUM 50 UG/1
50 TABLET ORAL DAILY
Qty: 90 TABLET | Refills: 1 | Status: SHIPPED | OUTPATIENT
Start: 2024-10-14

## 2024-10-14 RX ORDER — EZETIMIBE 10 MG/1
10 TABLET ORAL NIGHTLY
Qty: 90 TABLET | Refills: 1 | Status: SHIPPED | OUTPATIENT
Start: 2024-10-14

## 2024-10-14 RX ORDER — VIT C/E/ZN/COPPR/LUTEIN/ZEAXAN 250MG-90MG
500 CAPSULE ORAL DAILY
Qty: 90 TABLET | Refills: 1 | Status: SHIPPED | OUTPATIENT
Start: 2024-10-14 | End: 2025-04-12

## 2024-10-14 RX ORDER — MOMETASONE FUROATE AND FORMOTEROL FUMARATE DIHYDRATE 200; 5 UG/1; UG/1
2 AEROSOL RESPIRATORY (INHALATION) 2 TIMES DAILY
Qty: 39 G | Refills: 1 | Status: SHIPPED | OUTPATIENT
Start: 2024-10-14 | End: 2025-01-12

## 2024-10-14 RX ORDER — ERGOCALCIFEROL 1.25 MG/1
50000 CAPSULE ORAL
Qty: 12 CAPSULE | Refills: 1 | Status: SHIPPED | OUTPATIENT
Start: 2024-10-20 | End: 2025-04-18

## 2024-10-14 RX ORDER — ATORVASTATIN CALCIUM 40 MG/1
40 TABLET, FILM COATED ORAL DAILY
Qty: 90 TABLET | Refills: 1 | Status: SHIPPED | OUTPATIENT
Start: 2024-10-14

## 2024-10-14 RX ORDER — BISOPROLOL FUMARATE 5 MG/1
5 TABLET, FILM COATED ORAL DAILY
Qty: 90 TABLET | Refills: 1 | Status: SHIPPED | OUTPATIENT
Start: 2024-10-14

## 2024-10-14 ASSESSMENT — ENCOUNTER SYMPTOMS
DIARRHEA: 0
CONSTIPATION: 1
NAUSEA: 0
DIZZINESS: 0
DYSURIA: 0
BLOOD IN STOOL: 0
FEVER: 0
LIGHT-HEADEDNESS: 0
COUGH: 0
CHILLS: 0
SORE THROAT: 0
SHORTNESS OF BREATH: 0
AGITATION: 0
PALPITATIONS: 0
VOMITING: 0
ABDOMINAL PAIN: 0

## 2024-10-14 ASSESSMENT — ACTIVITIES OF DAILY LIVING (ADL)
DOING_HOUSEWORK: INDEPENDENT
BATHING: INDEPENDENT
MANAGING_FINANCES: INDEPENDENT
TAKING_MEDICATION: INDEPENDENT
DRESSING: INDEPENDENT
GROCERY_SHOPPING: INDEPENDENT

## 2024-10-14 ASSESSMENT — PATIENT HEALTH QUESTIONNAIRE - PHQ9
1. LITTLE INTEREST OR PLEASURE IN DOING THINGS: NOT AT ALL
2. FEELING DOWN, DEPRESSED OR HOPELESS: NOT AT ALL
SUM OF ALL RESPONSES TO PHQ9 QUESTIONS 1 AND 2: 0

## 2024-10-14 NOTE — ASSESSMENT & PLAN NOTE
Orders:    mometasone-formoterol (Dulera) 200-5 mcg/actuation inhaler; Inhale 2 puffs 2 times a day.

## 2024-10-14 NOTE — ASSESSMENT & PLAN NOTE
Orders:    levothyroxine (Synthroid, Levoxyl) 50 mcg tablet; Take 1 tablet (50 mcg) by mouth once daily. as directed

## 2024-10-14 NOTE — ASSESSMENT & PLAN NOTE
Orders:    dulaglutide 3 mg/0.5 mL pen injector; Inject 3 mg under the skin 1 (one) time per week.

## 2024-10-14 NOTE — ASSESSMENT & PLAN NOTE
Orders:    allopurinol (Zyloprim) 300 mg tablet; Take 1 tablet (300 mg) by mouth once daily.    Oscar obrien     23-May-2019 21:40

## 2024-10-14 NOTE — ASSESSMENT & PLAN NOTE
Orders:    docusate sodium (Colace) 100 mg capsule; Take 1 capsule (100 mg) by mouth 2 times a day.

## 2024-10-14 NOTE — ASSESSMENT & PLAN NOTE
Orders:    Referral to Orthopaedic Surgery; Future    Walker rolling     Paola Peoples  : 1956  Primary: [employer] (Medicare Managed)  Secondary:  [address] [facility] [address]  Phone: [phone]  Fax: [phone] Plan Frequency: 2x/wk for 8 weeks    Plan of Care/Certification Expiration Date: 23      >PT Visit Info:  Plan Frequency: 2x/wk for 8 weeks  Plan of Care/Certification Expiration Date: 23      Visit Count:  3    OUTPATIENT PHYSICAL THERAPY: Treatment Note 2023       Episode  }Appt Desk             Treatment Diagnosis:    No data found  Medical/Referring Diagnosis:      Referring Physician:  Kendrick Sanchez MD MD Orders:  PT Eval and Treat   Date of Onset:  Onset Date: 23     Allergies:   Patient has no known allergies. Restrictions/Precautions:  Restrictions/Precautions: Surgical protocol; Other (comment) (L anterior approach ANURAG)  No data recorded   Interventions Planned (Treatment may consist of any combination of the following):    Current Treatment Recommendations: Strengthening; ROM; Balance training; Functional mobility training; ADL/Self-care training; Transfer training; IADL training; Endurance training; Stair training; Gait training; Return to work related activity; Pain management; Home exercise program; Safety education & training; Therapeutic activities     >Subjective Comments: pt indicates that she went to f/u with surgeon who was very please with her progress and will f/u again in 2 months. >Initial:     0 /10>Post Session:     0   /10  Medications Last Reviewed:  2023  Updated Objective Findings: None today  Treatment     THERAPEUTIC EXERCISE: ( 17 minutes):    Exercises per grid below to improve mobility, strength, balance, and coordination. Required minimal visual, verbal, manual, and tactile cues to promote proper body mechanics. Progressed resistance and repetitions as indicated.      Date:  10/16/2023 Date:  10/31/23

## 2024-10-14 NOTE — PROGRESS NOTES
Subjective   Reason for Visit: Rufino Obrien is an 69 y.o. male here for a Medicare Wellness visit.          Reviewed all medications by prescribing practitioner or clinical pharmacist (such as prescriptions, OTCs, herbal therapies and supplements) and documented in the medical record.    HPI patient is a 69-year-old male presents to the office for annual wellness visit today.  Patient is up-to-date on age and gender recommended screening exception abdominal arctic aneurysm screening which he already has an order for this and we reprinted it for him today.  Also he has lung cancer screening supervised and monitored by his pulmonologist.  He is up-to-date on colonoscopy.  PSA blood work has been ordered with his labs that were ordered previously.  Patient is on CPAP for sleep apnea managed by Dr. Bright.  Patient compliant with CPAP.  We discussed importance of getting blood work also he is not checking his INR regularly.  Patient has severe issues with mobility due to chronic left hip pain.  This makes it difficult for him to leave the house to get his INR checked.  We have ordered a machine to help check his INR at home but he will need to have this managed by his cardiologist.  Patient also would like a referral to orthopedics due to his chronic left hip pain.  He has severe issues with walking requires a rollator which we did order a new rollator for him.  He is up-to-date on age and gender recommended vaccinations exception of tetanus vaccine and shingles vaccine which I recommend he get at his pharmacy.    Also complaining of some itching of the scalp states he has had this chronically in the past some flaky skin of the forehead.    Patient Care Team:  Bradford Ji DO as PCP - General (Internal Medicine)  Tatum Rothman MD as PCP - [facility] PCP     Review of Systems   Constitutional:  Negative for chills and fever.   HENT:  Negative for sore throat.    Eyes:  Negative for visual disturbance.  "  Respiratory:  Negative for cough and shortness of breath.    Cardiovascular:  Negative for chest pain, palpitations and leg swelling.   Gastrointestinal:  Positive for constipation. Negative for abdominal pain, blood in stool, diarrhea, nausea and vomiting.   Genitourinary:  Negative for dysuria.   Skin:  Negative for rash.   Neurological:  Negative for dizziness, syncope and light-headedness.   Psychiatric/Behavioral:  Negative for agitation.        Objective   Vitals:  /86 (BP Location: Left arm, Patient Position: Sitting, BP Cuff Size: Large adult)   Pulse 89   Temp 36.6 °C (97.9 °F)   Ht 1.854 m (6' 1\")   Wt 138 kg (305 lb 3.2 oz)   BMI 40.27 kg/m²       Physical Exam  Vitals and nursing note reviewed.   Constitutional:       General: He is not in acute distress.     Appearance: He is obese. He is ill-appearing (chronic ill appearing). He is not toxic-appearing or diaphoretic.   HENT:      Head: Normocephalic and atraumatic.      Mouth/Throat:      Mouth: Mucous membranes are moist.      Pharynx: Oropharynx is clear. No oropharyngeal exudate.   Eyes:      Extraocular Movements: Extraocular movements intact.      Pupils: Pupils are equal, round, and reactive to light.   Cardiovascular:      Rate and Rhythm: Normal rate. Rhythm irregular.      Heart sounds: Normal heart sounds.   Pulmonary:      Effort: Pulmonary effort is normal. No respiratory distress.      Breath sounds: Normal breath sounds. No wheezing, rhonchi or rales.   Abdominal:      General: There is no distension.      Palpations: Abdomen is soft. There is no mass.      Tenderness: There is no abdominal tenderness. There is no guarding or rebound.   Musculoskeletal:      Cervical back: Neck supple.      Right lower leg: No edema.      Left lower leg: No edema.   Lymphadenopathy:      Cervical: No cervical adenopathy.   Skin:     General: Skin is warm and dry.      Coloration: Skin is not jaundiced or pale.      Findings: Lesion (flakey " dry skin of forehead) present. No rash.   Neurological:      Mental Status: He is alert and oriented to person, place, and time. Mental status is at baseline.      Cranial Nerves: No cranial nerve deficit.   Psychiatric:         Mood and Affect: Mood normal.         Behavior: Behavior normal.         Thought Content: Thought content normal.         Judgment: Judgment normal.         Assessment & Plan  Need for influenza vaccination    Orders:    Flu vaccine, trivalent, preservative free, HIGH-DOSE, age 65y+ (Fluzone)    Primary osteoarthritis of left hip    Orders:    Referral to Orthopaedic Surgery; Future    Walker rolling    Atrial flutter with rapid ventricular response (Multi)    Orders:    prothrombin time/INR test metr misc; 1 kit every 14 (fourteen) days.    Chronic gout without tophus, unspecified cause, unspecified site    Orders:    allopurinol (Zyloprim) 300 mg tablet; Take 1 tablet (300 mg) by mouth once daily.    Walker rolling    Mixed hyperlipidemia    Orders:    atorvastatin (Lipitor) 40 mg tablet; Take 1 tablet (40 mg) by mouth once daily.    ezetimibe (Zetia) 10 mg tablet; Take 1 tablet (10 mg) by mouth once daily at bedtime.    Benign essential HTN    Orders:    bisoprolol (Zebeta) 5 mg tablet; Take 1 tablet (5 mg) by mouth once daily.    lisinopriL-hydrochlorothiazide 20-12.5 mg tablet; Take 1 tablet by mouth once daily.    Low vitamin B12 level    Orders:    cyanocobalamin (Vitamin B-12) 500 mcg tablet; Take 1 tablet (500 mcg) by mouth once daily.    Type 2 diabetes mellitus with hyperglycemia, without long-term current use of insulin    Orders:    dulaglutide 3 mg/0.5 mL pen injector; Inject 3 mg under the skin 1 (one) time per week.    Vitamin D deficiency    Orders:    ergocalciferol (Vitamin D-2) 1.25 MG (90809 UT) capsule; Take 1 capsule (50,000 Units) by mouth 1 (one) time per week.    Type 2 diabetes mellitus with hyperosmolarity, uncontrolled    Orders:    gabapentin (Neurontin) 300 mg  capsule; Take 1 capsule (300 mg) by mouth 3 times a day.    Coronary artery disease involving native coronary artery of native heart without angina pectoris    Orders:    isosorbide mononitrate ER (Imdur) 30 mg 24 hr tablet; Take 1 tablet (30 mg) by mouth once daily.    Hypothyroidism, unspecified type    Orders:    levothyroxine (Synthroid, Levoxyl) 50 mcg tablet; Take 1 tablet (50 mcg) by mouth once daily. as directed    Chronic bronchitis, unspecified chronic bronchitis type (Multi)    Orders:    mometasone-formoterol (Dulera) 200-5 mcg/actuation inhaler; Inhale 2 puffs 2 times a day.    Gastroesophageal reflux disease without esophagitis    Orders:    omeprazole (PriLOSEC) 20 mg DR capsule; Take 1 capsule (20 mg) by mouth once daily.    Constipation, unspecified constipation type    Orders:    docusate sodium (Colace) 100 mg capsule; Take 1 capsule (100 mg) by mouth 2 times a day.    Itching    Orders:    ketoconazole (NIZOral) 2 % cream; Apply topically 2 times a day for 14 days.         Health maintenance examination: Patient up-to-date on age and gender recommended screening exception of abdominal aortic aneurysm screening which has been ordered.  He is due for tetanus vaccine and shingles vaccine which I recommend he get at his pharmacy    Itching: Has evidence of seborrheic dermatitis of the scalp and forehead for which we have ordered ketoconazole cream.  Follow-up if symptoms or not improve or worsening    Constipation: He is up-to-date on colonoscopy he has been having issues with constipation is a chronic issue we will prescribe him Colace    GERD: Chronic, stable continue current medication regimen of Prilosec    Type 2 diabetes mellitus: He is due for hemoglobin A1c he will be continued on Trulicity,    Hypertension: Chronic, stable continue bisoprolol and lisinopril hydrochlorothiazide    Chronic gout: Chronic, stable continue allopurinol    Vitamin B12 deficiency: Chronic, stable continue vitamin  B12    Vitamin D deficiency: Chronic, stable continue vitamin D supplement    Diabetic neuropathy: Chronic, stable continue gabapentin OARRS report was reviewed    CAD: Denies any anginal symptoms Imdur will be continued    Hypothyroidism: Chronic, stable continue levothyroxine    Chronic bronchitis: Chronic, stable refills provided for Dulera    Atrial flutter with RVR: We have ordered him a INR test kit for home    Primary arthritic osteoporosis left hip: We have referred the patient to orthopedic surgery and ordered a walking rollator

## 2024-10-14 NOTE — ASSESSMENT & PLAN NOTE
Orders:    bisoprolol (Zebeta) 5 mg tablet; Take 1 tablet (5 mg) by mouth once daily.    lisinopriL-hydrochlorothiazide 20-12.5 mg tablet; Take 1 tablet by mouth once daily.

## 2024-10-14 NOTE — ASSESSMENT & PLAN NOTE
Orders:    atorvastatin (Lipitor) 40 mg tablet; Take 1 tablet (40 mg) by mouth once daily.    ezetimibe (Zetia) 10 mg tablet; Take 1 tablet (10 mg) by mouth once daily at bedtime.

## 2024-10-14 NOTE — ASSESSMENT & PLAN NOTE
Orders:    isosorbide mononitrate ER (Imdur) 30 mg 24 hr tablet; Take 1 tablet (30 mg) by mouth once daily.

## 2024-10-14 NOTE — ASSESSMENT & PLAN NOTE
Orders:    ergocalciferol (Vitamin D-2) 1.25 MG (19441 UT) capsule; Take 1 capsule (50,000 Units) by mouth 1 (one) time per week.

## 2024-10-14 NOTE — ASSESSMENT & PLAN NOTE
Orders:    gabapentin (Neurontin) 300 mg capsule; Take 1 capsule (300 mg) by mouth 3 times a day.

## 2024-10-14 NOTE — ASSESSMENT & PLAN NOTE
Orders:    cyanocobalamin (Vitamin B-12) 500 mcg tablet; Take 1 tablet (500 mcg) by mouth once daily.

## 2024-10-31 ENCOUNTER — OFFICE VISIT (OUTPATIENT)
Dept: ORTHOPEDIC SURGERY | Facility: CLINIC | Age: 69
End: 2024-10-31
Payer: COMMERCIAL

## 2024-10-31 ENCOUNTER — HOSPITAL ENCOUNTER (OUTPATIENT)
Dept: RADIOLOGY | Facility: CLINIC | Age: 69
Discharge: HOME | End: 2024-10-31
Payer: COMMERCIAL

## 2024-10-31 VITALS — WEIGHT: 305 LBS | HEIGHT: 73 IN | BODY MASS INDEX: 40.42 KG/M2

## 2024-10-31 DIAGNOSIS — M16.12 PRIMARY OSTEOARTHRITIS OF LEFT HIP: Primary | ICD-10-CM

## 2024-10-31 DIAGNOSIS — M16.12 PRIMARY OSTEOARTHRITIS OF LEFT HIP: ICD-10-CM

## 2024-10-31 PROCEDURE — 4004F PT TOBACCO SCREEN RCVD TLK: CPT | Performed by: STUDENT IN AN ORGANIZED HEALTH CARE EDUCATION/TRAINING PROGRAM

## 2024-10-31 PROCEDURE — 99204 OFFICE O/P NEW MOD 45 MIN: CPT | Performed by: STUDENT IN AN ORGANIZED HEALTH CARE EDUCATION/TRAINING PROGRAM

## 2024-10-31 PROCEDURE — 73502 X-RAY EXAM HIP UNI 2-3 VIEWS: CPT | Mod: LT

## 2024-10-31 PROCEDURE — 3008F BODY MASS INDEX DOCD: CPT | Performed by: STUDENT IN AN ORGANIZED HEALTH CARE EDUCATION/TRAINING PROGRAM

## 2024-10-31 PROCEDURE — 73502 X-RAY EXAM HIP UNI 2-3 VIEWS: CPT | Mod: LEFT SIDE | Performed by: RADIOLOGY

## 2024-10-31 PROCEDURE — 1159F MED LIST DOCD IN RCRD: CPT | Performed by: STUDENT IN AN ORGANIZED HEALTH CARE EDUCATION/TRAINING PROGRAM

## 2024-10-31 PROCEDURE — 1123F ACP DISCUSS/DSCN MKR DOCD: CPT | Performed by: STUDENT IN AN ORGANIZED HEALTH CARE EDUCATION/TRAINING PROGRAM

## 2024-10-31 PROCEDURE — 3044F HG A1C LEVEL LT 7.0%: CPT | Performed by: STUDENT IN AN ORGANIZED HEALTH CARE EDUCATION/TRAINING PROGRAM

## 2024-10-31 PROCEDURE — 1160F RVW MEDS BY RX/DR IN RCRD: CPT | Performed by: STUDENT IN AN ORGANIZED HEALTH CARE EDUCATION/TRAINING PROGRAM

## 2024-10-31 PROCEDURE — 3048F LDL-C <100 MG/DL: CPT | Performed by: STUDENT IN AN ORGANIZED HEALTH CARE EDUCATION/TRAINING PROGRAM

## 2024-10-31 PROCEDURE — 99214 OFFICE O/P EST MOD 30 MIN: CPT | Performed by: STUDENT IN AN ORGANIZED HEALTH CARE EDUCATION/TRAINING PROGRAM

## 2024-11-11 NOTE — PROGRESS NOTES
"Behavioral Health Psychotherapy Progress Note    Psychotherapy Provided: Individual Psychotherapy     1. Anxiety        2. Depression, unspecified depression type            Goals addressed in session: Goal 1     DATA: Therapist and client discussed client's concerns over who her father voted for. Therapist and client explored whether this is something that she would like to explore with her parents. Client expressed that her family does not usually speak about politics. Therapist and client discussed the pros and cons of client engaging in this type of conversation with her father. Client shared that she would prefer to feel anxious about not knowing who her father voted for, over her anxiety and depressive feelings if she discovered that her father did not vote for the same person as she did.    During this session, this clinician used the following therapeutic modalities: Client-centered Therapy, Cognitive Behavioral Therapy, Family Therapy, Gender Affirmation Therapy, Mindfulness-based Strategies, Solution-Focused Therapy, and Supportive Psychotherapy    Substance Abuse was not addressed during this session. If the client is diagnosed with a co-occurring substance use disorder, please indicate any changes in the frequency or amount of use: NA. Stage of change for addressing substance use diagnoses: No substance use/Not applicable    ASSESSMENT:  Eladia Colunga presents with a Anxious mood.     her affect is Normal range and intensity, which is congruent, with her mood and the content of the session. The client has made progress on their goals.     Eladia Colunga presents with a none risk of suicide, none risk of self-harm, and none risk of harm to others.    For any risk assessment that surpasses a \"low\" rating, a safety plan must be developed.    A safety plan was indicated: no  If yes, describe in detail NA    PLAN: Between sessions, Eladia Colunga will use her coping skills. At the next session, the therapist will use " "Subjective   Patient ID: Rufino Obrien is a 68 y.o. male who presents for Med Refill.    HPI   Patient here for follow-up of diabetes, hypertension, hyperlipidemia, peripheral vascular disease secondary to diabetes, atrial fibrillation, CAD.  Has been talking all his medications well.  Continue to lose weight.  She is close is getting much looser.  Last A1c in September was 7.2%.  Very pleased with the progress.  Wants to continue the current medication regimen.  Denies any chest pain, shortness of breath, headaches.      Review of Systems   Constitutional:  Negative for chills, fatigue and fever.   HENT:  Negative for congestion, ear pain, facial swelling, hearing loss, rhinorrhea and sore throat.    Respiratory:  Negative for cough, shortness of breath and wheezing.    Cardiovascular:  Negative for chest pain and palpitations.   Gastrointestinal:  Negative for abdominal pain, constipation, diarrhea, nausea and vomiting.   Genitourinary:  Negative for dysuria and frequency.   Musculoskeletal:  Negative for arthralgias and joint swelling.   Skin:  Negative for rash.   Neurological:  Negative for dizziness and syncope.       Objective   /70   Pulse 60   Ht 1.854 m (6' 1\")   SpO2 98%   BMI 39.71 kg/m²     Physical Exam  HENT:      Head: Normocephalic and atraumatic.      Right Ear: Tympanic membrane, ear canal and external ear normal.      Left Ear: Tympanic membrane, ear canal and external ear normal.      Nose: Nose normal.      Mouth/Throat:      Mouth: Mucous membranes are moist.      Pharynx: Oropharynx is clear. No posterior oropharyngeal erythema.   Eyes:      Conjunctiva/sclera: Conjunctivae normal.   Cardiovascular:      Rate and Rhythm: Normal rate and regular rhythm.      Pulses: Normal pulses.   Pulmonary:      Effort: Pulmonary effort is normal.      Breath sounds: Normal breath sounds.   Abdominal:      General: Abdomen is flat.      Palpations: Abdomen is soft.   Skin:     General: Skin is " Client-centered Therapy and Cognitive Behavioral Therapy to address anxiety.    Behavioral Health Treatment Plan and Discharge Planning: Eladia Colunga is aware of and agrees to continue to work on their treatment plan. They have identified and are working toward their discharge goals. yes    Visit start and stop times:    11/06/24  Start Time: 1402  Stop Time: 1448  Total Visit Time: 46 minutes  Virtual Regular Visit    Verification of patient location:    Patient is located at Home in the following state in which I hold an active license [place]      Assessment/Plan:    Problem List Items Addressed This Visit       Anxiety - Primary    Depression       Goals addressed in session: Goal 1          Reason for visit is No chief complaint on file.       Encounter provider JOVI Love      Recent Visits  Date Type Provider Dept   11/06/24 Telemedicine JOVI Love [facility] Therapist op   Showing recent visits within past 7 days and meeting all other requirements  Future Appointments  No visits were found meeting these conditions.  Showing future appointments within next 150 days and meeting all other requirements       The patient was identified by name and date of birth. Tommy Colunga was informed that this is a telemedicine visit and that the visit is being conducted throughthe Epic Embedded platform. She agrees to proceed..  My office door was closed. No one else was in the room.  She acknowledged consent and understanding of privacy and security of the video platform. The patient has agreed to participate and understands they can discontinue the visit at any time.    Patient is aware this is a billable service.     Subjective  Tommy Colunga is a 18 y.o. adult  .      HPI     No past medical history on file.    No past surgical history on file.    Current Outpatient Medications   Medication Sig Dispense Refill    escitalopram (LEXAPRO) 20 mg tablet Take 1 tablet (20 mg total) by mouth daily 90 tablet 0     warm and dry.   Neurological:      General: No focal deficit present.      Mental Status: He is alert.   Psychiatric:         Mood and Affect: Mood normal.         Behavior: Behavior normal.         Thought Content: Thought content normal.         Judgment: Judgment normal.         Assessment/Plan   Continue current medication regimen.  No changes when time.  Follow-up in 6 months for chronic condition review.  Blood work has been ordered for the patient.          estradiol (ESTRACE) 2 MG tablet Take 2 mg by mouth      HYDROcodone-acetaminophen (NORCO) 5-325 mg per tablet TAKE 1 TABLET EVERY 6 HOURS AS NEEDED FOR SEVERE PAIN (SCALE 7-10)      Progesterone 100 MG CAPS Take 100 mg by mouth      spironolactone (ALDACTONE) 50 mg tablet Take 50 mg by mouth       No current facility-administered medications for this visit.        Allergies   Allergen Reactions    Other Other (See Comments)     NA       Review of Systems    Video Exam    There were no vitals filed for this visit.    Physical Exam     Visit Time    11/06/24  Start Time: 1402  Stop Time: 1448  Total Visit Time: 46 minutes

## 2024-12-18 DIAGNOSIS — E11.65 TYPE 2 DIABETES MELLITUS WITH HYPERGLYCEMIA, WITHOUT LONG-TERM CURRENT USE OF INSULIN: ICD-10-CM

## 2025-03-20 ENCOUNTER — OFFICE VISIT (OUTPATIENT)
Dept: PRIMARY CARE | Facility: CLINIC | Age: 70
End: 2025-03-20
Payer: COMMERCIAL

## 2025-03-20 ENCOUNTER — APPOINTMENT (OUTPATIENT)
Dept: CARDIOLOGY | Facility: CLINIC | Age: 70
End: 2025-03-20
Payer: COMMERCIAL

## 2025-03-20 VITALS
TEMPERATURE: 97.7 F | HEIGHT: 73 IN | SYSTOLIC BLOOD PRESSURE: 152 MMHG | DIASTOLIC BLOOD PRESSURE: 84 MMHG | BODY MASS INDEX: 40.24 KG/M2

## 2025-03-20 VITALS
DIASTOLIC BLOOD PRESSURE: 84 MMHG | SYSTOLIC BLOOD PRESSURE: 152 MMHG | HEIGHT: 73 IN | HEART RATE: 85 BPM | TEMPERATURE: 97.7 F | BODY MASS INDEX: 40.24 KG/M2

## 2025-03-20 DIAGNOSIS — E06.3 HYPOTHYROIDISM DUE TO HASHIMOTO THYROIDITIS: ICD-10-CM

## 2025-03-20 DIAGNOSIS — I10 BENIGN ESSENTIAL HTN: ICD-10-CM

## 2025-03-20 DIAGNOSIS — M16.12 PRIMARY OSTEOARTHRITIS OF LEFT HIP: ICD-10-CM

## 2025-03-20 DIAGNOSIS — E11.65 TYPE 2 DIABETES MELLITUS WITH HYPERGLYCEMIA, WITHOUT LONG-TERM CURRENT USE OF INSULIN: ICD-10-CM

## 2025-03-20 DIAGNOSIS — Z86.79 HISTORY OF ATRIAL FIBRILLATION: Primary | ICD-10-CM

## 2025-03-20 DIAGNOSIS — M1A.9XX0 CHRONIC GOUT WITHOUT TOPHUS, UNSPECIFIED CAUSE, UNSPECIFIED SITE: ICD-10-CM

## 2025-03-20 DIAGNOSIS — E03.9 HYPOTHYROIDISM, UNSPECIFIED TYPE: ICD-10-CM

## 2025-03-20 DIAGNOSIS — I48.92 ATRIAL FLUTTER WITH RAPID VENTRICULAR RESPONSE (MULTI): ICD-10-CM

## 2025-03-20 DIAGNOSIS — R79.89 LOW VITAMIN B12 LEVEL: ICD-10-CM

## 2025-03-20 DIAGNOSIS — Z91.199 NONCOMPLIANCE WITH TREATMENT: ICD-10-CM

## 2025-03-20 DIAGNOSIS — E11.42 DIABETIC POLYNEUROPATHY ASSOCIATED WITH TYPE 2 DIABETES MELLITUS: ICD-10-CM

## 2025-03-20 DIAGNOSIS — J42 CHRONIC BRONCHITIS, UNSPECIFIED CHRONIC BRONCHITIS TYPE (MULTI): ICD-10-CM

## 2025-03-20 DIAGNOSIS — I48.21 PERMANENT ATRIAL FIBRILLATION (MULTI): ICD-10-CM

## 2025-03-20 DIAGNOSIS — E55.9 VITAMIN D DEFICIENCY: ICD-10-CM

## 2025-03-20 DIAGNOSIS — I25.10 CORONARY ARTERY DISEASE INVOLVING NATIVE CORONARY ARTERY OF NATIVE HEART WITHOUT ANGINA PECTORIS: ICD-10-CM

## 2025-03-20 DIAGNOSIS — E11.00 TYPE 2 DIABETES MELLITUS WITH HYPEROSMOLARITY, UNCONTROLLED: ICD-10-CM

## 2025-03-20 DIAGNOSIS — E78.2 MIXED HYPERLIPIDEMIA: Primary | ICD-10-CM

## 2025-03-20 DIAGNOSIS — E78.2 MIXED HYPERLIPIDEMIA: ICD-10-CM

## 2025-03-20 DIAGNOSIS — Z79.01 CHRONIC ANTICOAGULATION: ICD-10-CM

## 2025-03-20 DIAGNOSIS — K21.9 GASTROESOPHAGEAL REFLUX DISEASE WITHOUT ESOPHAGITIS: ICD-10-CM

## 2025-03-20 DIAGNOSIS — R26.2 DIFFICULTY IN WALKING: ICD-10-CM

## 2025-03-20 DIAGNOSIS — R26.9 GAIT DISTURBANCE: ICD-10-CM

## 2025-03-20 PROBLEM — K63.5 COLON POLYPS: Status: ACTIVE | Noted: 2024-05-14

## 2025-03-20 PROBLEM — K57.30 COLON, DIVERTICULOSIS: Status: ACTIVE | Noted: 2024-05-14

## 2025-03-20 PROBLEM — R07.9 CHEST PAIN: Status: RESOLVED | Noted: 2023-06-30 | Resolved: 2025-03-20

## 2025-03-20 PROBLEM — K64.4 EXTERNAL HEMORRHOIDS: Status: ACTIVE | Noted: 2024-05-14

## 2025-03-20 PROCEDURE — 1159F MED LIST DOCD IN RCRD: CPT | Performed by: INTERNAL MEDICINE

## 2025-03-20 PROCEDURE — 99215 OFFICE O/P EST HI 40 MIN: CPT | Performed by: INTERNAL MEDICINE

## 2025-03-20 PROCEDURE — 1123F ACP DISCUSS/DSCN MKR DOCD: CPT | Performed by: INTERNAL MEDICINE

## 2025-03-20 PROCEDURE — 1160F RVW MEDS BY RX/DR IN RCRD: CPT | Performed by: INTERNAL MEDICINE

## 2025-03-20 PROCEDURE — 3079F DIAST BP 80-89 MM HG: CPT | Performed by: INTERNAL MEDICINE

## 2025-03-20 PROCEDURE — 3077F SYST BP >= 140 MM HG: CPT | Performed by: INTERNAL MEDICINE

## 2025-03-20 PROCEDURE — 99214 OFFICE O/P EST MOD 30 MIN: CPT | Performed by: INTERNAL MEDICINE

## 2025-03-20 PROCEDURE — 93000 ELECTROCARDIOGRAM COMPLETE: CPT | Performed by: INTERNAL MEDICINE

## 2025-03-20 RX ORDER — LISINOPRIL AND HYDROCHLOROTHIAZIDE 12.5; 2 MG/1; MG/1
1 TABLET ORAL DAILY
Qty: 90 TABLET | Refills: 1 | Status: SHIPPED | OUTPATIENT
Start: 2025-03-20

## 2025-03-20 RX ORDER — GABAPENTIN 300 MG/1
300 CAPSULE ORAL 3 TIMES DAILY
Qty: 270 CAPSULE | Refills: 1 | Status: SHIPPED | OUTPATIENT
Start: 2025-03-20

## 2025-03-20 RX ORDER — MOMETASONE FUROATE AND FORMOTEROL FUMARATE DIHYDRATE 200; 5 UG/1; UG/1
2 AEROSOL RESPIRATORY (INHALATION) 2 TIMES DAILY
Qty: 39 G | Refills: 1 | Status: SHIPPED | OUTPATIENT
Start: 2025-03-20 | End: 2025-06-18

## 2025-03-20 RX ORDER — ALLOPURINOL 300 MG/1
300 TABLET ORAL DAILY
Qty: 90 TABLET | Refills: 1 | Status: SHIPPED | OUTPATIENT
Start: 2025-03-20 | End: 2025-09-16

## 2025-03-20 RX ORDER — VIT C/E/ZN/COPPR/LUTEIN/ZEAXAN 250MG-90MG
500 CAPSULE ORAL DAILY
Qty: 90 TABLET | Refills: 1 | Status: SHIPPED | OUTPATIENT
Start: 2025-03-20 | End: 2025-09-16

## 2025-03-20 RX ORDER — DOCUSATE SODIUM 100 MG/1
CAPSULE, LIQUID FILLED ORAL
COMMUNITY
Start: 2025-02-16

## 2025-03-20 RX ORDER — ATORVASTATIN CALCIUM 40 MG/1
40 TABLET, FILM COATED ORAL DAILY
Qty: 90 TABLET | Refills: 1 | Status: SHIPPED | OUTPATIENT
Start: 2025-03-20

## 2025-03-20 RX ORDER — BISOPROLOL FUMARATE 5 MG/1
5 TABLET, FILM COATED ORAL DAILY
Qty: 90 TABLET | Refills: 1 | Status: SHIPPED | OUTPATIENT
Start: 2025-03-20

## 2025-03-20 RX ORDER — ISOSORBIDE MONONITRATE 30 MG/1
30 TABLET, EXTENDED RELEASE ORAL DAILY
Qty: 90 TABLET | Refills: 1 | Status: SHIPPED | OUTPATIENT
Start: 2025-03-20

## 2025-03-20 RX ORDER — EZETIMIBE 10 MG/1
10 TABLET ORAL NIGHTLY
Qty: 90 TABLET | Refills: 1 | Status: SHIPPED | OUTPATIENT
Start: 2025-03-20

## 2025-03-20 RX ORDER — OMEPRAZOLE 20 MG/1
20 CAPSULE, DELAYED RELEASE ORAL DAILY
Qty: 90 CAPSULE | Refills: 1 | Status: SHIPPED | OUTPATIENT
Start: 2025-03-20

## 2025-03-20 RX ORDER — ERGOCALCIFEROL 1.25 MG/1
50000 CAPSULE ORAL
Qty: 12 CAPSULE | Refills: 1 | Status: SHIPPED | OUTPATIENT
Start: 2025-03-23 | End: 2025-09-19

## 2025-03-20 RX ORDER — LEVOTHYROXINE SODIUM 50 UG/1
50 TABLET ORAL DAILY
Qty: 90 TABLET | Refills: 1 | Status: SHIPPED | OUTPATIENT
Start: 2025-03-20

## 2025-03-20 ASSESSMENT — ENCOUNTER SYMPTOMS
DIZZINESS: 0
LIGHT-HEADEDNESS: 0
CONSTIPATION: 1
HEMATURIA: 0
ACTIVITY CHANGE: 1
PALPITATIONS: 0
NAUSEA: 0
SHORTNESS OF BREATH: 0
WEAKNESS: 1
BLOOD IN STOOL: 0
DIARRHEA: 0
ARTHRALGIAS: 1

## 2025-03-20 ASSESSMENT — PATIENT HEALTH QUESTIONNAIRE - PHQ9
2. FEELING DOWN, DEPRESSED OR HOPELESS: NOT AT ALL
SUM OF ALL RESPONSES TO PHQ9 QUESTIONS 1 AND 2: 0
1. LITTLE INTEREST OR PLEASURE IN DOING THINGS: NOT AT ALL
SUM OF ALL RESPONSES TO PHQ9 QUESTIONS 1 AND 2: 0
1. LITTLE INTEREST OR PLEASURE IN DOING THINGS: NOT AT ALL
2. FEELING DOWN, DEPRESSED OR HOPELESS: NOT AT ALL

## 2025-03-20 NOTE — PROGRESS NOTES
"  Patient:  Rufino Obrien  YOB: 1955  MRN: [id number]       Chief Complaint/Active Symptoms:       Rufino Obrien is a 69 y.o. male who returns today for cardiac follow-up of permanent atrial fibrillation, hypertension, hyperlipidemia and congestive heart failure.  Patient has a history of noncompliance with laboratory evaluation.    Noncompliant with INR. No labs in the past year.     No blood in the urine or stool. No shortness of breath at low workload. Patient is in bed all day long. Transfers to a bedside commode to go the bathroom. No chest pain or discomfort. States he is unable to walk even 2-3 steps. Brought to the office on a stretcher.     Review of Systems   Constitutional:  Positive for activity change.   Respiratory:  Negative for shortness of breath.    Cardiovascular:  Negative for chest pain, palpitations and leg swelling.   Gastrointestinal:  Positive for constipation. Negative for blood in stool, diarrhea and nausea.   Genitourinary: Negative.  Negative for hematuria.   Musculoskeletal:  Positive for arthralgias (left hip pain, right leg \"gives out\") and gait problem.   Neurological:  Positive for weakness. Negative for dizziness and light-headedness.   All other systems reviewed and are negative.      Objective:     Vitals:    03/20/25 1309   BP: 152/84   Temp: 36.5 °C (97.7 °F)       Vitals:     Vitals:    03/20/25 1309   BP: 152/84   Temp: 36.5 °C (97.7 °F)         Allergies:     No Known Allergies       Medications:     Current Outpatient Medications   Medication Instructions    albuterol 90 mcg/actuation inhaler 2 puffs, Every 4 hours PRN    albuterol 2.5 mg, Every 6 hours PRN    allopurinol (ZYLOPRIM) 300 mg, oral, Daily    ammonium lactate (Lac-Hydrin) 12 % lotion 2 Applications, 2 times daily    atorvastatin (LIPITOR) 40 mg, oral, Daily    azelastine 205.5 mcg (0.15 %) spray,non-aerosol Daily RT    bisoprolol (ZEBETA) 5 mg, oral, Daily    cyanocobalamin (VITAMIN B-12) " 500 mcg, oral, Daily    docusate sodium (Colace) 100 mg capsule     dulaglutide (TRULICITY) 3 mg, subcutaneous, Once Weekly    ergocalciferol (VITAMIN D-2) 50,000 Units, oral, Once Weekly    ezetimibe (ZETIA) 10 mg, oral, Nightly    famotidine (PEPCID) 40 mg, Nightly    fluticasone (Flonase) 50 mcg/actuation nasal spray 2 sprays, Daily    gabapentin (NEURONTIN) 300 mg, oral, 3 times daily    GaviLyte-G 236-22.74-6.74 -5.86 gram solution MIX AND DRINK AS DIRECTED    ipratropium-albuteroL (Duo-Neb) 0.5-2.5 mg/3 mL nebulizer solution 3 mL, 4 times daily RT    isosorbide mononitrate ER (IMDUR) 30 mg, oral, Daily    levothyroxine (SYNTHROID, LEVOXYL) 50 mcg, oral, Daily, as directed    lisinopriL-hydrochlorothiazide 20-12.5 mg tablet 1 tablet, oral, Daily    mometasone-formoterol (Dulera) 200-5 mcg/actuation inhaler 2 puffs, inhalation, 2 times daily    nitroglycerin (NITROSTAT) 0.4 mg, Every 5 min PRN    omeprazole (PRILOSEC) 20 mg, oral, Daily    prothrombin time/INR test metr misc 1 kit, miscellaneous, Every 14 days    tobramycin (Tobrex) 0.3 % ophthalmic solution 2 drops, Every 4 hours    warfarin (Coumadin) 5 mg tablet Take 2 tablets daily as directed by coumadin clinic       Physical Examination:   GENERAL:  Well developed, well nourished, in no acute distress.  HEENT: NC AT, EOMI with anicteric sclera, bilateral arcus senilis  NECK:   no JVD, no bruit.  CHEST:  Symmetric and nontender.  LUNGS:  Clear to auscultation bilaterally, normal respiratory effort.  HEART: PMI is not palpable.  Heart sounds are distant.  There is an irregularly irregular rhythm with a normal S1 and S2 without appreciable murmur or S3  ABDOMEN: Obese, soft, NT, ND without palpable organomegaly or bruits-Limited evaluation as patient is sitting on a couch in the room  EXTREMITIES:  Warm with good color, no clubbing or cyanosis.  There is 1+ edema noted.  PERIPHERAL VASCULAR:  Pulses present and equally palpable; 2+  "throughout.  MUSCULOSKELETAL: Osteoarthritic changes  NEURO/PSYCH:  Alert and oriented times three with approppriate behavior and responses.  Patient is nonambulatory.  States he is unable to stand.  Lymph: No significant palpable lymphadenopathy  Skin: no rash or lesions on exposed skin or reported.    Lab:     CBC:   Lab Results   Component Value Date    WBC 8.0 01/30/2024    RBC 4.16 (L) 01/30/2024    HGB 12.7 (L) 01/30/2024    HCT 39.7 (L) 01/30/2024     01/30/2024        CMP:    Lab Results   Component Value Date     01/30/2024    K 3.9 01/30/2024     01/30/2024    CO2 20 (L) 01/30/2024    BUN 23 01/30/2024    CREATININE 1.64 (H) 01/30/2024    GLUCOSE 137 (H) 01/30/2024    CALCIUM 9.3 01/30/2024       Magnesium:    Lab Results   Component Value Date    MG 1.55 (L) 12/23/2021       Lipid Profile:    Lab Results   Component Value Date    TRIG 53 01/30/2024    HDL 33.1 01/30/2024    LDLCALC 70 01/30/2024       TSH:    Lab Results   Component Value Date    TSH 3.14 01/30/2024       BNP:   No results found for: \"BNP\"     PT/INR:    Lab Results   Component Value Date    PROTIME 29.0 (H) 09/13/2023    INR 3.70 (A) 04/10/2024       HgBA1c:    Lab Results   Component Value Date    HGBA1C 6.5 (H) 01/30/2024       BMP:  Lab Results   Component Value Date     01/30/2024     09/13/2023     11/10/2022     10/25/2022    K 3.9 01/30/2024    K 4.5 09/13/2023    K 4.9 11/10/2022    K 4.8 10/25/2022     01/30/2024     09/13/2023     11/10/2022     10/25/2022    CO2 20 (L) 01/30/2024    CO2 21 09/13/2023    CO2 25 11/10/2022    CO2 24 10/25/2022    BUN 23 01/30/2024    BUN 30 (H) 09/13/2023    BUN 24 (H) 11/10/2022    BUN 23 10/25/2022    CREATININE 1.64 (H) 01/30/2024    CREATININE 1.94 (H) 09/13/2023    CREATININE 1.58 (H) 11/10/2022    CREATININE 1.77 (H) 10/25/2022       Cardiac Enzymes:    No results found for: \"TROPHS\"    Hepatic Function Panel:    Lab " Results   Component Value Date    ALKPHOS 107 01/30/2024    ALT 14 01/30/2024    AST 14 01/30/2024    PROT 7.5 01/30/2024    BILITOT 0.7 01/30/2024    BILIDIR 0.3 02/14/2023     Old labs reviewed    Diagnostic Studies:     No echocardiogram results found for the past 12 months    No nuclear medicine results found for the past 12 months    No valid procedures specified.    EKG:   EKG today shows atrial fibrillation with PVCs versus aberrant complexes, diffuse nonspecific T wave abnormality.    Radiology:     No orders to display         ASSESSMENT     Problem List Items Addressed This Visit       Permanent atrial fibrillation (Multi)    Relevant Orders    ECG 12 lead (Clinic Performed) (Completed)    Comprehensive Metabolic Panel    Follow Up In Cardiology    Benign essential HTN    Relevant Orders    Comprehensive Metabolic Panel    Follow Up In Cardiology    RESOLVED: CAD (coronary artery disease)    Hyperlipidemia    Relevant Orders    Lipid Panel    Follow Up In Cardiology    Noncompliance with treatment    RESOLVED: History of atrial fibrillation - Primary    Chronic anticoagulation    Relevant Orders    ECG 12 lead (Clinic Performed) (Completed)    CBC and Auto Differential    Follow Up In Cardiology    Protime-INR       PLAN     1.  Permanent atrial fibrillation on chronic warfarin anticoagulation.  It was explained to the patient and his wife and I am unable to prescribe his medications as he is noncompliant with follow-up and monitoring.  I explained that without an INR measurement I cannot adjust to make sure that his dose of warfarin is appropriate and safe.  Until I have more recent labs for renal function I also cannot prescribe an alternative for warfarin such as Eliquis or Xarelto.  We will try to see if he qualifies and can have a home PT/INR monitoring.  Will still need to have at least annual lab work checking his CBC.  2.  Hypertension.  Blood pressures are uncontrolled.  It is unclear if he is  taking his medications consistently.  3.  Hyperlipidemia.  Last lipid panel shows LDL cholesterol was acceptable.  4.  Gait problem or disturbance.  The whole reason the patient does not come to any appointments or get his labs this because he is reportedly nonambulatory.  He came on a stretcher today.  States he is unable to walk even to his bathroom at home.  He is being ready to be seen and evaluated by his primary care physician.  Patient either needs to consider home health care where he can receive health care evaluations and have labs drawn from home or consider until he is ambulatory moving to some form of either assisted living or skilled nursing facility.    Once we have his labs we will be able to prescribe medications.  If he is not compliant with PT/INR monitoring he will be changed either to Eliquis/Xarelto or discharge from the practice for noncompliance.  The  and I will work together to see if he qualifies for home PT/INR monitoring but he is aware he will need to have baseline labs drawn before we can even go that route.

## 2025-03-20 NOTE — PROGRESS NOTES
"Subjective   Patient ID: Rufino Obrien is a 69 y.o. male who presents for Follow-up.    HPI     Review of Systems    Objective   /84 (BP Location: Left arm, Patient Position: Sitting, BP Cuff Size: Adult)   Pulse 85   Temp 36.5 °C (97.7 °F)   Ht 1.854 m (6' 1\")   BMI 40.24 kg/m²     Physical Exam    Assessment/Plan   {Assess/PlanSmartLinks:13301}       " "Patient Position: Sitting, BP Cuff Size: Adult)   Pulse 85   Temp 36.5 °C (97.7 °F)   Ht 1.854 m (6' 1\")   BMI 40.24 kg/m²     Physical Exam  Vitals and nursing note reviewed.   Constitutional:       General: He is not in acute distress.     Appearance: Normal appearance. He is obese. He is not ill-appearing, toxic-appearing or diaphoretic.   HENT:      Head: Normocephalic and atraumatic.   Musculoskeletal:      Right lower leg: No edema.      Left lower leg: No edema.   Skin:     General: Skin is warm and dry.      Coloration: Skin is not jaundiced or pale.      Findings: No rash.   Neurological:      General: No focal deficit present.      Mental Status: He is alert and oriented to person, place, and time.      Cranial Nerves: No cranial nerve deficit.   Psychiatric:         Mood and Affect: Mood normal.         Behavior: Behavior normal.         Thought Content: Thought content normal.         Judgment: Judgment normal.         Assessment/Plan   Problem List Items Addressed This Visit             ICD-10-CM    Atrial flutter with rapid ventricular response (Multi) I48.92    Relevant Medications    bisoprolol (Zebeta) 5 mg tablet    isosorbide mononitrate ER (Imdur) 30 mg 24 hr tablet    Other Relevant Orders    Protime-INR    Benign essential HTN I10    Relevant Medications    bisoprolol (Zebeta) 5 mg tablet    lisinopriL-hydrochlorothiazide 20-12.5 mg tablet    COPD (chronic obstructive pulmonary disease) (Multi) J44.9    Relevant Medications    mometasone-formoterol (Dulera) 200-5 mcg/actuation inhaler    Diabetes (Multi) E11.9    Relevant Medications    dulaglutide (Trulicity) 3 mg/0.5 mL injection    Diabetic neuropathy (Multi) E11.40    Relevant Orders    Referral to Home Care    Gastroesophageal reflux disease without esophagitis K21.9    Relevant Medications    omeprazole (PriLOSEC) 20 mg DR capsule    Hyperlipidemia - Primary E78.5    Relevant Medications    atorvastatin (Lipitor) 40 mg tablet    " ezetimibe (Zetia) 10 mg tablet    Other Relevant Orders    Lipid panel    Primary osteoarthritis of left hip M16.12    Relevant Orders    Referral to Home Care    Vitamin D deficiency E55.9    Relevant Medications    ergocalciferol (Vitamin D-2) 1250 mcg (50,000 units) capsule    Type 2 diabetes mellitus with hyperosmolarity, uncontrolled E11.00    Relevant Medications    gabapentin (Neurontin) 300 mg capsule    Gout M10.9    Relevant Medications    allopurinol (Zyloprim) 300 mg tablet    Hypothyroidism E03.9    Relevant Medications    levothyroxine (Synthroid, Levoxyl) 50 mcg tablet    Other Relevant Orders    TSH with reflex to Free T4 if abnormal    Low vitamin B12 level R79.89    Relevant Medications    cyanocobalamin (Vitamin B-12) 500 mcg tablet    Other Relevant Orders    Vitamin B12    Gait disturbance R26.9    Relevant Orders    Referral to Home Care    Difficulty in walking R26.2    Relevant Orders    Referral to Home Care    Coronary artery disease involving native coronary artery of native heart without angina pectoris I25.10    Relevant Medications    bisoprolol (Zebeta) 5 mg tablet    isosorbide mononitrate ER (Imdur) 30 mg 24 hr tablet     Atrial fibrillation with rapid ventricular response: Currently rate controlled in the office refills provided for bisoprolol and isosorbide mononitrate and pro time has been ordered    Hypertension: Chronic, uncontrolled will continue bisoprolol and lisinopril hydrochlorothiazide for now until he follows up with cardiology.    COPD: Chronic, stable refills provided for Dulera    Type 2 diabetes mellitus: He is due for blood work to check his A1c so we know what the status is of his diabetes in the meantime Trulicity has been refilled    GERD: Chronic, stable refills provided for omeprazole    Hyperlipidemia: Lipid panel ordered to assess.  Refills provided for atorvastatin and Zetia    Primary osteoarthritis of left hip: Referral to home care has been  placed    Vitamin D deficiency: Refills for ergocalciferol was placed we will check a vitamin D level    Diabetic neuropathy: Chronic, stable refills provided for gabapentin OARRS report is reviewed and appropriate    Gout: Chronic, stable check uric acid level also continue allopurinol    Hypothyroidism: Will check a TSH level refills provided for levothyroxine    Low vitamin B12 level refills provided for vitamin B12 will check a vitamin B12 level    Gait disturbance/difficulty walking: Referrals placed for home care    Coronary artery disease: He has appointment with cardiology today for further evaluation and treatment

## 2025-03-20 NOTE — PATIENT INSTRUCTIONS
Get labs as soon as possible    Once initial labs done can apply for home PT/INR monitoring once a week  If cannot do home INR monitoring then MUST move to eliquis or xarelto

## 2025-03-21 ENCOUNTER — TELEPHONE (OUTPATIENT)
Dept: CARDIOLOGY | Facility: CLINIC | Age: 70
End: 2025-03-21
Payer: COMMERCIAL

## 2025-03-21 NOTE — TELEPHONE ENCOUNTER
----- Message from Stephanie Otero sent at 3/20/2025  1:47 PM EDT -----  See if patient qualifies for home PT/INR monitoring. If he does - give me form to sign

## 2025-03-25 ENCOUNTER — DOCUMENTATION (OUTPATIENT)
Dept: HOME HEALTH SERVICES | Facility: HOME HEALTH | Age: 70
End: 2025-03-25
Payer: COMMERCIAL

## 2025-03-25 NOTE — HH CARE COORDINATION
This referral has been made a Non Admit with  Home Care due to No Skilled Disciplines Ordered. If you have further questions, feel free to reach out to our office at [phone]. Thank you, [facility] Intake.

## 2025-04-01 PROBLEM — R26.9 GAIT DISTURBANCE: Status: ACTIVE | Noted: 2025-04-01

## 2025-04-01 PROBLEM — R26.2 DIFFICULTY IN WALKING: Status: ACTIVE | Noted: 2025-04-01

## 2025-04-01 PROBLEM — I25.10 CORONARY ARTERY DISEASE INVOLVING NATIVE CORONARY ARTERY OF NATIVE HEART WITHOUT ANGINA PECTORIS: Status: ACTIVE | Noted: 2025-04-01

## 2025-04-01 ASSESSMENT — ENCOUNTER SYMPTOMS
CONFUSION: 0
CHILLS: 0
ARTHRALGIAS: 1
ABDOMINAL PAIN: 0
AGITATION: 0
COUGH: 0
DIZZINESS: 0
WEAKNESS: 1
NAUSEA: 0
DIARRHEA: 0
FEVER: 0
DYSURIA: 0
PALPITATIONS: 0
SORE THROAT: 0
SHORTNESS OF BREATH: 0
LIGHT-HEADEDNESS: 0
VOMITING: 0
CONSTIPATION: 1

## 2025-04-07 ENCOUNTER — APPOINTMENT (OUTPATIENT)
Dept: CARDIOLOGY | Facility: HOSPITAL | Age: 70
End: 2025-04-07
Payer: COMMERCIAL

## 2025-04-07 ENCOUNTER — HOSPITAL ENCOUNTER (INPATIENT)
Facility: HOSPITAL | Age: 70
LOS: 3 days | Discharge: HOME | End: 2025-04-10
Attending: STUDENT IN AN ORGANIZED HEALTH CARE EDUCATION/TRAINING PROGRAM | Admitting: INTERNAL MEDICINE
Payer: COMMERCIAL

## 2025-04-07 ENCOUNTER — APPOINTMENT (OUTPATIENT)
Dept: RADIOLOGY | Facility: HOSPITAL | Age: 70
End: 2025-04-07
Payer: COMMERCIAL

## 2025-04-07 DIAGNOSIS — J18.9 PNEUMONIA DUE TO INFECTIOUS ORGANISM, UNSPECIFIED LATERALITY, UNSPECIFIED PART OF LUNG: ICD-10-CM

## 2025-04-07 DIAGNOSIS — I48.20 CHRONIC ATRIAL FIBRILLATION (MULTI): ICD-10-CM

## 2025-04-07 DIAGNOSIS — J44.9 CHRONIC OBSTRUCTIVE PULMONARY DISEASE, UNSPECIFIED COPD TYPE (MULTI): ICD-10-CM

## 2025-04-07 DIAGNOSIS — R06.02 SHORTNESS OF BREATH: Primary | ICD-10-CM

## 2025-04-07 DIAGNOSIS — I50.9 ACUTE ON CHRONIC CONGESTIVE HEART FAILURE, UNSPECIFIED HEART FAILURE TYPE: ICD-10-CM

## 2025-04-07 DIAGNOSIS — R09.02 HYPOXIA: ICD-10-CM

## 2025-04-07 LAB
ALBUMIN SERPL BCP-MCNC: 3 G/DL (ref 3.4–5)
ALP SERPL-CCNC: 69 U/L (ref 33–136)
ALT SERPL W P-5'-P-CCNC: 10 U/L (ref 10–52)
ANION GAP SERPL CALC-SCNC: 14 MMOL/L (ref 10–20)
AST SERPL W P-5'-P-CCNC: 13 U/L (ref 9–39)
BASOPHILS # BLD AUTO: 0.04 X10*3/UL (ref 0–0.1)
BASOPHILS NFR BLD AUTO: 0.5 %
BILIRUB SERPL-MCNC: 1.1 MG/DL (ref 0–1.2)
BNP SERPL-MCNC: 814 PG/ML (ref 0–99)
BUN SERPL-MCNC: 26 MG/DL (ref 6–23)
CALCIUM SERPL-MCNC: 8.7 MG/DL (ref 8.6–10.3)
CARDIAC TROPONIN I PNL SERPL HS: 48 NG/L (ref 0–20)
CARDIAC TROPONIN I PNL SERPL HS: 48 NG/L (ref 0–20)
CHLORIDE SERPL-SCNC: 101 MMOL/L (ref 98–107)
CO2 SERPL-SCNC: 24 MMOL/L (ref 21–32)
CREAT SERPL-MCNC: 1.45 MG/DL (ref 0.5–1.3)
EGFRCR SERPLBLD CKD-EPI 2021: 52 ML/MIN/1.73M*2
EOSINOPHIL # BLD AUTO: 0.21 X10*3/UL (ref 0–0.7)
EOSINOPHIL NFR BLD AUTO: 2.4 %
ERYTHROCYTE [DISTWIDTH] IN BLOOD BY AUTOMATED COUNT: 17.3 % (ref 11.5–14.5)
FLUAV RNA RESP QL NAA+PROBE: NOT DETECTED
FLUBV RNA RESP QL NAA+PROBE: NOT DETECTED
GLUCOSE SERPL-MCNC: 109 MG/DL (ref 74–99)
HCT VFR BLD AUTO: 36.7 % (ref 41–52)
HGB BLD-MCNC: 11.4 G/DL (ref 13.5–17.5)
IMM GRANULOCYTES # BLD AUTO: 0.06 X10*3/UL (ref 0–0.7)
IMM GRANULOCYTES NFR BLD AUTO: 0.7 % (ref 0–0.9)
INR PPP: 1.4 (ref 0.9–1.1)
LYMPHOCYTES # BLD AUTO: 1.53 X10*3/UL (ref 1.2–4.8)
LYMPHOCYTES NFR BLD AUTO: 17.7 %
MAGNESIUM SERPL-MCNC: 1.06 MG/DL (ref 1.6–2.4)
MCH RBC QN AUTO: 30.6 PG (ref 26–34)
MCHC RBC AUTO-ENTMCNC: 31.1 G/DL (ref 32–36)
MCV RBC AUTO: 98 FL (ref 80–100)
MONOCYTES # BLD AUTO: 0.82 X10*3/UL (ref 0.1–1)
MONOCYTES NFR BLD AUTO: 9.5 %
NEUTROPHILS # BLD AUTO: 5.98 X10*3/UL (ref 1.2–7.7)
NEUTROPHILS NFR BLD AUTO: 69.2 %
NRBC BLD-RTO: 0 /100 WBCS (ref 0–0)
PLATELET # BLD AUTO: 235 X10*3/UL (ref 150–450)
POTASSIUM SERPL-SCNC: 4 MMOL/L (ref 3.5–5.3)
PROT SERPL-MCNC: 7.2 G/DL (ref 6.4–8.2)
PROTHROMBIN TIME: 15.1 SECONDS (ref 9.8–12.4)
RBC # BLD AUTO: 3.73 X10*6/UL (ref 4.5–5.9)
SARS-COV-2 RNA RESP QL NAA+PROBE: NOT DETECTED
SODIUM SERPL-SCNC: 135 MMOL/L (ref 136–145)
WBC # BLD AUTO: 8.6 X10*3/UL (ref 4.4–11.3)

## 2025-04-07 PROCEDURE — 84484 ASSAY OF TROPONIN QUANT: CPT | Performed by: STUDENT IN AN ORGANIZED HEALTH CARE EDUCATION/TRAINING PROGRAM

## 2025-04-07 PROCEDURE — 85025 COMPLETE CBC W/AUTO DIFF WBC: CPT | Performed by: STUDENT IN AN ORGANIZED HEALTH CARE EDUCATION/TRAINING PROGRAM

## 2025-04-07 PROCEDURE — 36415 COLL VENOUS BLD VENIPUNCTURE: CPT | Performed by: STUDENT IN AN ORGANIZED HEALTH CARE EDUCATION/TRAINING PROGRAM

## 2025-04-07 PROCEDURE — 80053 COMPREHEN METABOLIC PANEL: CPT | Performed by: STUDENT IN AN ORGANIZED HEALTH CARE EDUCATION/TRAINING PROGRAM

## 2025-04-07 PROCEDURE — 94640 AIRWAY INHALATION TREATMENT: CPT

## 2025-04-07 PROCEDURE — 85610 PROTHROMBIN TIME: CPT | Performed by: STUDENT IN AN ORGANIZED HEALTH CARE EDUCATION/TRAINING PROGRAM

## 2025-04-07 PROCEDURE — 2500000002 HC RX 250 W HCPCS SELF ADMINISTERED DRUGS (ALT 637 FOR MEDICARE OP, ALT 636 FOR OP/ED): Performed by: STUDENT IN AN ORGANIZED HEALTH CARE EDUCATION/TRAINING PROGRAM

## 2025-04-07 PROCEDURE — 2500000004 HC RX 250 GENERAL PHARMACY W/ HCPCS (ALT 636 FOR OP/ED): Performed by: STUDENT IN AN ORGANIZED HEALTH CARE EDUCATION/TRAINING PROGRAM

## 2025-04-07 PROCEDURE — 71045 X-RAY EXAM CHEST 1 VIEW: CPT | Performed by: RADIOLOGY

## 2025-04-07 PROCEDURE — 96365 THER/PROPH/DIAG IV INF INIT: CPT

## 2025-04-07 PROCEDURE — 94660 CPAP INITIATION&MGMT: CPT

## 2025-04-07 PROCEDURE — 96375 TX/PRO/DX INJ NEW DRUG ADDON: CPT

## 2025-04-07 PROCEDURE — 71250 CT THORAX DX C-: CPT

## 2025-04-07 PROCEDURE — 99223 1ST HOSP IP/OBS HIGH 75: CPT | Performed by: INTERNAL MEDICINE

## 2025-04-07 PROCEDURE — 87636 SARSCOV2 & INF A&B AMP PRB: CPT | Performed by: STUDENT IN AN ORGANIZED HEALTH CARE EDUCATION/TRAINING PROGRAM

## 2025-04-07 PROCEDURE — 93005 ELECTROCARDIOGRAM TRACING: CPT

## 2025-04-07 PROCEDURE — 99285 EMERGENCY DEPT VISIT HI MDM: CPT | Mod: 25 | Performed by: STUDENT IN AN ORGANIZED HEALTH CARE EDUCATION/TRAINING PROGRAM

## 2025-04-07 PROCEDURE — 83735 ASSAY OF MAGNESIUM: CPT | Performed by: STUDENT IN AN ORGANIZED HEALTH CARE EDUCATION/TRAINING PROGRAM

## 2025-04-07 PROCEDURE — 71045 X-RAY EXAM CHEST 1 VIEW: CPT

## 2025-04-07 PROCEDURE — 1200000002 HC GENERAL ROOM WITH TELEMETRY DAILY

## 2025-04-07 PROCEDURE — 2500000002 HC RX 250 W HCPCS SELF ADMINISTERED DRUGS (ALT 637 FOR MEDICARE OP, ALT 636 FOR OP/ED)

## 2025-04-07 PROCEDURE — 83880 ASSAY OF NATRIURETIC PEPTIDE: CPT | Performed by: STUDENT IN AN ORGANIZED HEALTH CARE EDUCATION/TRAINING PROGRAM

## 2025-04-07 PROCEDURE — 5A09357 ASSISTANCE WITH RESPIRATORY VENTILATION, LESS THAN 24 CONSECUTIVE HOURS, CONTINUOUS POSITIVE AIRWAY PRESSURE: ICD-10-PCS | Performed by: STUDENT IN AN ORGANIZED HEALTH CARE EDUCATION/TRAINING PROGRAM

## 2025-04-07 PROCEDURE — 71250 CT THORAX DX C-: CPT | Performed by: RADIOLOGY

## 2025-04-07 RX ORDER — MAGNESIUM SULFATE HEPTAHYDRATE 40 MG/ML
2 INJECTION, SOLUTION INTRAVENOUS ONCE
Status: COMPLETED | OUTPATIENT
Start: 2025-04-07 | End: 2025-04-07

## 2025-04-07 RX ORDER — POLYETHYLENE GLYCOL 3350 17 G/17G
17 POWDER, FOR SOLUTION ORAL DAILY PRN
Status: DISCONTINUED | OUTPATIENT
Start: 2025-04-07 | End: 2025-04-10 | Stop reason: HOSPADM

## 2025-04-07 RX ORDER — IPRATROPIUM BROMIDE AND ALBUTEROL SULFATE 2.5; .5 MG/3ML; MG/3ML
3 SOLUTION RESPIRATORY (INHALATION)
Status: DISCONTINUED | OUTPATIENT
Start: 2025-04-08 | End: 2025-04-08

## 2025-04-07 RX ORDER — FUROSEMIDE 10 MG/ML
40 INJECTION INTRAMUSCULAR; INTRAVENOUS ONCE
Status: COMPLETED | OUTPATIENT
Start: 2025-04-07 | End: 2025-04-07

## 2025-04-07 RX ORDER — BUDESONIDE 0.5 MG/2ML
0.5 INHALANT ORAL
Status: DISCONTINUED | OUTPATIENT
Start: 2025-04-07 | End: 2025-04-10 | Stop reason: HOSPADM

## 2025-04-07 RX ORDER — CEFTRIAXONE 2 G/50ML
2 INJECTION, SOLUTION INTRAVENOUS ONCE
Status: COMPLETED | OUTPATIENT
Start: 2025-04-07 | End: 2025-04-07

## 2025-04-07 RX ORDER — IPRATROPIUM BROMIDE AND ALBUTEROL SULFATE 2.5; .5 MG/3ML; MG/3ML
3 SOLUTION RESPIRATORY (INHALATION) ONCE
Status: COMPLETED | OUTPATIENT
Start: 2025-04-07 | End: 2025-04-07

## 2025-04-07 RX ORDER — ALBUTEROL SULFATE 90 UG/1
2 INHALANT RESPIRATORY (INHALATION) EVERY 6 HOURS PRN
Status: DISCONTINUED | OUTPATIENT
Start: 2025-04-07 | End: 2025-04-08

## 2025-04-07 RX ORDER — IPRATROPIUM BROMIDE AND ALBUTEROL SULFATE 2.5; .5 MG/3ML; MG/3ML
SOLUTION RESPIRATORY (INHALATION)
Status: COMPLETED
Start: 2025-04-07 | End: 2025-04-07

## 2025-04-07 RX ADMIN — IPRATROPIUM BROMIDE AND ALBUTEROL SULFATE 3 ML: .5; 3 SOLUTION RESPIRATORY (INHALATION) at 18:56

## 2025-04-07 RX ADMIN — AZITHROMYCIN MONOHYDRATE 500 MG: 500 INJECTION, POWDER, LYOPHILIZED, FOR SOLUTION INTRAVENOUS at 20:58

## 2025-04-07 RX ADMIN — IPRATROPIUM BROMIDE AND ALBUTEROL SULFATE 3 ML: .5; 3 SOLUTION RESPIRATORY (INHALATION) at 22:47

## 2025-04-07 RX ADMIN — FUROSEMIDE 40 MG: 10 INJECTION, SOLUTION INTRAMUSCULAR; INTRAVENOUS at 20:21

## 2025-04-07 RX ADMIN — MAGNESIUM SULFATE HEPTAHYDRATE 2 G: 40 INJECTION, SOLUTION INTRAVENOUS at 22:30

## 2025-04-07 RX ADMIN — CEFTRIAXONE SODIUM 2 G: 2 INJECTION, SOLUTION INTRAVENOUS at 20:22

## 2025-04-07 ASSESSMENT — LIFESTYLE VARIABLES
TOTAL SCORE: 0
HAVE PEOPLE ANNOYED YOU BY CRITICIZING YOUR DRINKING: NO
HAVE YOU EVER FELT YOU SHOULD CUT DOWN ON YOUR DRINKING: NO
EVER HAD A DRINK FIRST THING IN THE MORNING TO STEADY YOUR NERVES TO GET RID OF A HANGOVER: NO
EVER FELT BAD OR GUILTY ABOUT YOUR DRINKING: NO

## 2025-04-07 ASSESSMENT — COLUMBIA-SUICIDE SEVERITY RATING SCALE - C-SSRS
2. HAVE YOU ACTUALLY HAD ANY THOUGHTS OF KILLING YOURSELF?: NO
1. IN THE PAST MONTH, HAVE YOU WISHED YOU WERE DEAD OR WISHED YOU COULD GO TO SLEEP AND NOT WAKE UP?: NO
6. HAVE YOU EVER DONE ANYTHING, STARTED TO DO ANYTHING, OR PREPARED TO DO ANYTHING TO END YOUR LIFE?: NO

## 2025-04-07 NOTE — ED PROVIDER NOTES
EMERGENCY DEPARTMENT ENCOUNTER      Pt Name: Rufino Obrien  MRN: [id number]  Birthdate 1955  Date of evaluation: 4/7/2025  Provider: Breezy Mayer DO    CHIEF COMPLAINT       Chief Complaint   Patient presents with    Chest Pain    Shortness of Breath       HISTORY OF PRESENT ILLNESS    Rufino Obrien is a 69 y.o. male who presents to the emergency department with EMS for approximately 5 days of shortness of breath and intermittent productive cough.  He endorses clear to yellow sputum discharge.  He has felt warm at home but no measured fevers.  He is uncertain of any sick contacts.  Typically does not have a baseline oxygen requirement although utilizes CPAP at night.  Due to decreased sleep habits persistent cough and shortness of breath he is presenting today for further evaluation.  He has been compliant with his warfarin for history of atrial fibrillation.  Denies any new or worsening swelling in the lower extremities.          Nursing Notes were reviewed.    REVIEW OF SYSTEMS     CONSTITUTIONAL: Endorses subjective fevers.  Denies, sweats, chills.   NEURO: Denies difficulty walking, numbness, weakness, tingling, headache.   HEENT: Denies sore throat, rhinorrhea, changes in vision.   CARDIO: Denies chest pain, palpitations.  PULM: Endorses shortness of breath and cough.    GI: Denies abdominal pain, nausea, vomiting, diarrhea, constipation, melena, hematochezia.  : Denies painful urination, frequency, hematuria.   MSK: Denies recent trauma.   SKIN: Denies rash, lesions.   ENDOCRINE: Denies unexpected weight-loss.   HEME: Denies bleeding disorder.     PAST MEDICAL HISTORY     Past Medical History:   Diagnosis Date    Pain in left hip 12/29/2020    Left hip pain    Personal history of other diseases of the circulatory system     History of atrial fibrillation       SURGICAL HISTORY       Past Surgical History:   Procedure Laterality Date    ANKLE SURGERY  12/14/2016    Ankle Surgery     CHOLECYSTECTOMY  12/12/2016    Cholecystectomy Laparoscopic    OTHER SURGICAL HISTORY  10/23/2021    Complete colonoscopy    OTHER SURGICAL HISTORY  10/18/2018    Incisional Hernia Repair       ALLERGIES     Patient has no known allergies.    FAMILY HISTORY       Family History   Problem Relation Name Age of Onset    Hypertension Mother      Diabetes Father      Prostate cancer Father      Hypertension Sister      Hyperlipidemia Sister      Diabetes Sister      Sleep apnea Sister      Heart attack Brother          SOCIAL HISTORY       Social History     Socioeconomic History    Marital status:    Tobacco Use    Smoking status: Every Day     Types: Cigarettes     Passive exposure: Current    Smokeless tobacco: Never   Vaping Use    Vaping status: Some Days    Substances: THC   Substance and Sexual Activity    Alcohol use: Never    Drug use: Yes     Types: Marijuana     Comment: as needed for pain    Sexual activity: Not Currently       PHYSICAL EXAM   VS: As documented in the triage note from today's date and EMR flowsheet were reviewed.  Gen: Well developed. No acute distress. Seated in bed. Appears nontoxic.  GCS 15.  Skin: Warm. Dry. Intact. No rashes or lesions.  Eyes: Pupils equally round and reactive to light. Clear sclera.   HENT: Atraumatic appearance. Mucosal membranes moist. No oral lesions, uvula midline, airway patent.   CV: Regular rate and irregular rhythm. S1, S2.  +2 bilateral pitting pedal edema. Warm extremities.  Resp: Nonlabored breathing rhonchorous throughout slight inspiratory expiratory wheeze noted in the apices. No increased work of breathing.  Saturating appropriately on 2 L nasal cannula.  GI: Soft and nontender. No rebound or guarding. Bowel sounds x4 present.   MSK: Symmetric muscle bulk. No joint swelling in the extremities. Compartments are soft. Neurovascularly intact x4 extremities. Radial pulses +2 equal bilaterally.  Pedal pulses +2 equal bilaterally.  Neuro: Alert. Speech  fluent. Moving all extremities. No focal deficits.   Psych: Appropriate. Kempt.    DIAGNOSTIC RESULTS   RADIOLOGY:   Non-plain film images such as CT, Ultrasound and MRI are read by the radiologist. Plain radiographic images are visualized and preliminarily interpreted by the emergency physician with the below findings: Chest x-ray concerning for bilateral infiltrates.      Interpretation per the Radiologist below, if available at the time of this note:    XR chest 1 view   Final Result   Findings suggestive of a degree of pulmonary edema/CHF.        MACRO:   None        Signed by: Kurtis Charles 4/7/2025 7:22 PM   Dictation workstation:   [id number]      CT chest wo IV contrast    (Results Pending)         ED BEDSIDE ULTRASOUND:   Performed by ED Physician - none    LABS:  Labs Reviewed   CBC WITH AUTO DIFFERENTIAL - Abnormal       Result Value    WBC 8.6      nRBC 0.0      RBC 3.73 (*)     Hemoglobin 11.4 (*)     Hematocrit 36.7 (*)     MCV 98      MCH 30.6      MCHC 31.1 (*)     RDW 17.3 (*)     Platelets 235      Neutrophils % 69.2      Immature Granulocytes %, Automated 0.7      Lymphocytes % 17.7      Monocytes % 9.5      Eosinophils % 2.4      Basophils % 0.5      Neutrophils Absolute 5.98      Immature Granulocytes Absolute, Automated 0.06      Lymphocytes Absolute 1.53      Monocytes Absolute 0.82      Eosinophils Absolute 0.21      Basophils Absolute 0.04     COMPREHENSIVE METABOLIC PANEL - Abnormal    Glucose 109 (*)     Sodium 135 (*)     Potassium 4.0      Chloride 101      Bicarbonate 24      Anion Gap 14      Urea Nitrogen 26 (*)     Creatinine 1.45 (*)     eGFR 52 (*)     Calcium 8.7      Albumin 3.0 (*)     Alkaline Phosphatase 69      Total Protein 7.2      AST 13      Bilirubin, Total 1.1      ALT 10     MAGNESIUM - Abnormal    Magnesium 1.06 (*)    PROTIME-INR - Abnormal    Protime 15.1 (*)     INR 1.4 (*)    B-TYPE NATRIURETIC PEPTIDE - Abnormal     (*)     Narrative:        <100 pg/mL  - Heart failure unlikely  100-299 pg/mL - Intermediate probability of acute heart                  failure exacerbation. Correlate with clinical                  context and patient history.    >=300 pg/mL - Heart Failure likely. Correlate with clinical                  context and patient history.    BNP testing is performed using different testing methodology at [facility] than at other [facility]. Direct result comparisons should only be made within the same method.      SERIAL TROPONIN-INITIAL - Abnormal    Troponin I, High Sensitivity 48 (*)     Narrative:     Less than 99th percentile of normal range cutoff-  Female and children under 18 years old <14 ng/L; Male <21 ng/L: Negative  Repeat testing should be performed if clinically indicated.     Female and children under 18 years old 14-50 ng/L; Male 21-50 ng/L:  Consistent with possible cardiac damage and possible increased clinical   risk. Serial measurements may help to assess extent of myocardial damage.     >50 ng/L: Consistent with cardiac damage, increased clinical risk and  myocardial infarction. Serial measurements may help assess extent of   myocardial damage.      NOTE: Children less than 1 year old may have higher baseline troponin   levels and results should be interpreted in conjunction with the overall   clinical context.     NOTE: Troponin I testing is performed using a different   testing methodology at [facility] than at other   [facility]. Direct result comparisons should only   be made within the same method.   SERIAL TROPONIN, 1 HOUR - Abnormal    Troponin I, High Sensitivity 48 (*)     Narrative:     Less than 99th percentile of normal range cutoff-  Female and children under 18 years old <14 ng/L; Male <21 ng/L: Negative  Repeat testing should be performed if clinically indicated.     Female and children under 18 years old 14-50 ng/L; Male 21-50 ng/L:  Consistent with possible cardiac damage and  possible increased clinical   risk. Serial measurements may help to assess extent of myocardial damage.     >50 ng/L: Consistent with cardiac damage, increased clinical risk and  myocardial infarction. Serial measurements may help assess extent of   myocardial damage.      NOTE: Children less than 1 year old may have higher baseline troponin   levels and results should be interpreted in conjunction with the overall   clinical context.     NOTE: Troponin I testing is performed using a different   testing methodology at [facility] than at other   [facility]. Direct result comparisons should only   be made within the same method.   SARS-COV-2 AND INFLUENZA A/B PCR - Normal    Flu A Result Not Detected      Flu B Result Not Detected      Coronavirus 2019, PCR Not Detected      Narrative:     This assay is an FDA-cleared, in vitro diagnostic nucleic acid amplification test for the qualitative detection and differentiation of SARS CoV-2/ Influenza A/B from nasopharyngeal specimens collected from individuals with signs and symptoms of respiratory tract infections, and has been validated for use at [facility]. Negative results do not preclude COVID-19/ Influenza A/B infections and should not be used as the sole basis for diagnosis, treatment, or other management decisions. Testing for SARS CoV-2 is recommended only for patients who meet current clinical and/or epidemiological criteria defined by federal, state, or local public health directives.   TROPONIN SERIES- (INITIAL, 1 HR)    Narrative:     The following orders were created for panel order Troponin I Series, High Sensitivity (0, 1 HR).  Procedure                               Abnormality         Status                     ---------                               -----------         ------                     Troponin I, High Sensiti...[[id number]]  Abnormal            Final result               Troponin, High  Sensitivi...[[id number]]  Abnormal            Final result                 Please view results for these tests on the individual orders.       All other labs were within normal range or not returned as of this dictation.    EMERGENCY DEPARTMENT COURSE/MDM:   Vitals:    Vitals:    04/07/25 2000 04/07/25 2030   BP: (!) 164/111 (!) 164/107   BP Location: Right arm Right arm   Patient Position: Lying Lying   Pulse: (!) 104 97   Resp: (!) 22 (!) 22   Temp:     SpO2: 98% 97%       I reviewed the patient's triage vitals and they are hypertensive recommend follow-up with primary physician for repeat checks.  Mildly tachycardic and tachypneic.    Due to the above findings the following was ordered cardiac workup to include INR.    Workup is pursued patient is found to be hypomagnesemic IV magnesium was ordered for supplementation.  There are no additional significant electrolyte derangements.  Renal function is improved from baseline.  Cardide troponin x 2 no significant delta change no acute injury pattern on EKG suspect demand in nature low concern for atypical ACS at this time.  BNP is grossly elevated I do have a concern for fluid overload therefore 40 mg IV Lasix was given for diuresis.  Patient does feel somewhat improved after the DuoNeb treatment.  Chest x-ray is concerning for infiltrates therefore was covered with Rocephin and azithromycin to cover for CAP.  Mild anemia slightly lower than baseline no active signs of bleeding recommend close outpatient follow-up.  Patient is resting comfortably currently rate controlled.  Does have history of A-fib.  Patient is agreeable with hospital admission for continued workup and treatment.  Spoke with the admitting hospitalist is agreed to accept the patient they did take over care at time of admission order.    ED Course as of 04/07/25 2221 Mon Apr 07, 2025   1840 Interpreted by the Emergency Department Attending: ECG revealed atrial flutter at a rate of 105 beats per  minute with KS interval * , QRS of 110 , QTc of 511.  No acute injury pattern. Previous EKG on December 22 revealed nonspecific changes.    [MG]   2011 Interpreted by the Emergency Department Attending: ECG revealed atrial flutter isolated PVCs at a rate of 105 beats per minute with KS interval * , QRS of 110 , QTc of 513.  No acute injury pattern.    [MG]      ED Course User Index  [MG] Breezy CollierannaconstantineDO         Diagnoses as of 04/07/25 2221   Shortness of breath   Acute on chronic congestive heart failure, unspecified heart failure type   Pneumonia due to infectious organism, unspecified laterality, unspecified part of lung   Hypoxia       Patient was counseled regarding labs, imaging, likely diagnosis, and plan. All questions were answered.     ------------------------------------------------------------------  Information provided by the patient and EMS  Consults hospitalist  Past medical history complicating workup COPD  Previous medical records reviewed office visit 3/26/2025  Considered noninvasive positive pressure although no indication at this time patient resting comfortably.  Shared medical decision making patient agreeable with hospital admission for continued treatment and workup.  ------------------------------------------------------------------  ED Medications administered this visit:    Medications   magnesium sulfate 2 g in sterile water for injection 50 mL (has no administration in time range)   polyethylene glycol (Glycolax, Miralax) packet 17 g (has no administration in time range)   ipratropium-albuteroL (Duo-Neb) 0.5-2.5 mg/3 mL nebulizer solution 3 mL (3 mL nebulization Given 4/7/25 1856)   cefTRIAXone (Rocephin) 2 g in dextrose (iso) IV 50 mL (0 g intravenous Stopped 4/7/25 2057)   azithromycin (Zithromax) 500 mg in dextrose 5%  mL (0 mg intravenous Stopped 4/7/25 2208)   furosemide (Lasix) injection 40 mg (40 mg intravenous Given 4/7/25 2021)          Final Impression:   1.  Shortness of breath    2. Acute on chronic congestive heart failure, unspecified heart failure type    3. Pneumonia due to infectious organism, unspecified laterality, unspecified part of lung    4. Hypoxia          Breezy Mayer DO    (Please note that portions of this note were completed with a voice recognition program.  Efforts were made to edit the dictations but occasionally words are mis-transcribed.)     Breezy Mayer DO  04/07/25 9687

## 2025-04-08 ENCOUNTER — APPOINTMENT (OUTPATIENT)
Dept: CARDIOLOGY | Facility: HOSPITAL | Age: 70
End: 2025-04-08
Payer: COMMERCIAL

## 2025-04-08 LAB
ALBUMIN SERPL BCP-MCNC: 2.9 G/DL (ref 3.4–5)
ANION GAP SERPL CALC-SCNC: 13 MMOL/L (ref 10–20)
AORTIC VALVE MEAN GRADIENT: 2 MMHG
AORTIC VALVE PEAK VELOCITY: 1.03 M/S
AV PEAK GRADIENT: 4 MMHG
AVA (PEAK VEL): 1.97 CM2
AVA (VTI): 2.79 CM2
BUN SERPL-MCNC: 28 MG/DL (ref 6–23)
CALCIUM SERPL-MCNC: 8.8 MG/DL (ref 8.6–10.3)
CARDIAC TROPONIN I PNL SERPL HS: 49 NG/L (ref 0–20)
CHLORIDE SERPL-SCNC: 99 MMOL/L (ref 98–107)
CO2 SERPL-SCNC: 27 MMOL/L (ref 21–32)
CREAT SERPL-MCNC: 1.6 MG/DL (ref 0.5–1.3)
EGFRCR SERPLBLD CKD-EPI 2021: 46 ML/MIN/1.73M*2
EJECTION FRACTION APICAL 4 CHAMBER: 36.8
EJECTION FRACTION: 38 %
ERYTHROCYTE [DISTWIDTH] IN BLOOD BY AUTOMATED COUNT: 17 % (ref 11.5–14.5)
GLUCOSE BLD MANUAL STRIP-MCNC: 113 MG/DL (ref 74–99)
GLUCOSE BLD MANUAL STRIP-MCNC: 137 MG/DL (ref 74–99)
GLUCOSE BLD MANUAL STRIP-MCNC: 172 MG/DL (ref 74–99)
GLUCOSE BLD MANUAL STRIP-MCNC: 245 MG/DL (ref 74–99)
GLUCOSE BLD MANUAL STRIP-MCNC: 92 MG/DL (ref 74–99)
GLUCOSE SERPL-MCNC: 104 MG/DL (ref 74–99)
HCT VFR BLD AUTO: 32.5 % (ref 41–52)
HGB BLD-MCNC: 10.4 G/DL (ref 13.5–17.5)
LEFT VENTRICLE INTERNAL DIMENSION DIASTOLE: 6.8 CM (ref 3.5–6)
LEFT VENTRICULAR OUTFLOW TRACT DIAMETER: 2.2 CM
MAGNESIUM SERPL-MCNC: 1.28 MG/DL (ref 1.6–2.4)
MCH RBC QN AUTO: 30.8 PG (ref 26–34)
MCHC RBC AUTO-ENTMCNC: 32 G/DL (ref 32–36)
MCV RBC AUTO: 96 FL (ref 80–100)
NRBC BLD-RTO: 0.2 /100 WBCS (ref 0–0)
PHOSPHATE SERPL-MCNC: 4 MG/DL (ref 2.5–4.9)
PLATELET # BLD AUTO: 217 X10*3/UL (ref 150–450)
POTASSIUM SERPL-SCNC: 4 MMOL/L (ref 3.5–5.3)
PROCALCITONIN SERPL-MCNC: 0.18 NG/ML
RBC # BLD AUTO: 3.38 X10*6/UL (ref 4.5–5.9)
SODIUM SERPL-SCNC: 135 MMOL/L (ref 136–145)
WBC # BLD AUTO: 8.6 X10*3/UL (ref 4.4–11.3)

## 2025-04-08 PROCEDURE — 85027 COMPLETE CBC AUTOMATED: CPT | Performed by: INTERNAL MEDICINE

## 2025-04-08 PROCEDURE — 2500000004 HC RX 250 GENERAL PHARMACY W/ HCPCS (ALT 636 FOR OP/ED): Performed by: INTERNAL MEDICINE

## 2025-04-08 PROCEDURE — 2500000002 HC RX 250 W HCPCS SELF ADMINISTERED DRUGS (ALT 637 FOR MEDICARE OP, ALT 636 FOR OP/ED): Performed by: INTERNAL MEDICINE

## 2025-04-08 PROCEDURE — 84145 PROCALCITONIN (PCT): CPT | Mod: PARLAB | Performed by: INTERNAL MEDICINE

## 2025-04-08 PROCEDURE — 94640 AIRWAY INHALATION TREATMENT: CPT

## 2025-04-08 PROCEDURE — C8929 TTE W OR WO FOL WCON,DOPPLER: HCPCS

## 2025-04-08 PROCEDURE — 2500000001 HC RX 250 WO HCPCS SELF ADMINISTERED DRUGS (ALT 637 FOR MEDICARE OP): Performed by: INTERNAL MEDICINE

## 2025-04-08 PROCEDURE — 36415 COLL VENOUS BLD VENIPUNCTURE: CPT | Performed by: INTERNAL MEDICINE

## 2025-04-08 PROCEDURE — 93306 TTE W/DOPPLER COMPLETE: CPT | Performed by: STUDENT IN AN ORGANIZED HEALTH CARE EDUCATION/TRAINING PROGRAM

## 2025-04-08 PROCEDURE — 99232 SBSQ HOSP IP/OBS MODERATE 35: CPT | Performed by: INTERNAL MEDICINE

## 2025-04-08 PROCEDURE — 82947 ASSAY GLUCOSE BLOOD QUANT: CPT

## 2025-04-08 PROCEDURE — 84100 ASSAY OF PHOSPHORUS: CPT | Performed by: INTERNAL MEDICINE

## 2025-04-08 PROCEDURE — 84484 ASSAY OF TROPONIN QUANT: CPT | Performed by: INTERNAL MEDICINE

## 2025-04-08 PROCEDURE — 2060000001 HC INTERMEDIATE ICU ROOM DAILY

## 2025-04-08 PROCEDURE — 2500000005 HC RX 250 GENERAL PHARMACY W/O HCPCS: Performed by: INTERNAL MEDICINE

## 2025-04-08 PROCEDURE — 94660 CPAP INITIATION&MGMT: CPT

## 2025-04-08 PROCEDURE — 83735 ASSAY OF MAGNESIUM: CPT | Performed by: INTERNAL MEDICINE

## 2025-04-08 RX ORDER — PREDNISONE 20 MG/1
40 TABLET ORAL DAILY
Status: DISCONTINUED | OUTPATIENT
Start: 2025-04-08 | End: 2025-04-10 | Stop reason: HOSPADM

## 2025-04-08 RX ORDER — GABAPENTIN 300 MG/1
300 CAPSULE ORAL 3 TIMES DAILY
Status: DISCONTINUED | OUTPATIENT
Start: 2025-04-08 | End: 2025-04-10 | Stop reason: HOSPADM

## 2025-04-08 RX ORDER — IPRATROPIUM BROMIDE AND ALBUTEROL SULFATE 2.5; .5 MG/3ML; MG/3ML
3 SOLUTION RESPIRATORY (INHALATION)
Status: DISCONTINUED | OUTPATIENT
Start: 2025-04-08 | End: 2025-04-09

## 2025-04-08 RX ORDER — PANTOPRAZOLE SODIUM 40 MG/1
40 TABLET, DELAYED RELEASE ORAL
Status: DISCONTINUED | OUTPATIENT
Start: 2025-04-08 | End: 2025-04-10 | Stop reason: HOSPADM

## 2025-04-08 RX ORDER — FLUTICASONE PROPIONATE 50 MCG
2 SPRAY, SUSPENSION (ML) NASAL DAILY
Status: DISCONTINUED | OUTPATIENT
Start: 2025-04-08 | End: 2025-04-10 | Stop reason: HOSPADM

## 2025-04-08 RX ORDER — ALLOPURINOL 300 MG/1
300 TABLET ORAL DAILY
Status: DISCONTINUED | OUTPATIENT
Start: 2025-04-08 | End: 2025-04-10 | Stop reason: HOSPADM

## 2025-04-08 RX ORDER — BISOPROLOL FUMARATE 5 MG/1
5 TABLET, FILM COATED ORAL DAILY
Status: DISCONTINUED | OUTPATIENT
Start: 2025-04-08 | End: 2025-04-10 | Stop reason: HOSPADM

## 2025-04-08 RX ORDER — LEVOTHYROXINE SODIUM 50 UG/1
50 TABLET ORAL DAILY
Status: DISCONTINUED | OUTPATIENT
Start: 2025-04-08 | End: 2025-04-10 | Stop reason: HOSPADM

## 2025-04-08 RX ORDER — MAGNESIUM SULFATE HEPTAHYDRATE 40 MG/ML
2 INJECTION, SOLUTION INTRAVENOUS ONCE
Status: COMPLETED | OUTPATIENT
Start: 2025-04-08 | End: 2025-04-08

## 2025-04-08 RX ORDER — CEFTRIAXONE 1 G/50ML
1 INJECTION, SOLUTION INTRAVENOUS EVERY 24 HOURS
Status: DISCONTINUED | OUTPATIENT
Start: 2025-04-08 | End: 2025-04-10 | Stop reason: HOSPADM

## 2025-04-08 RX ORDER — FUROSEMIDE 10 MG/ML
40 INJECTION INTRAMUSCULAR; INTRAVENOUS EVERY 12 HOURS
Status: DISCONTINUED | OUTPATIENT
Start: 2025-04-08 | End: 2025-04-10 | Stop reason: HOSPADM

## 2025-04-08 RX ORDER — ATORVASTATIN CALCIUM 40 MG/1
40 TABLET, FILM COATED ORAL DAILY
Status: DISCONTINUED | OUTPATIENT
Start: 2025-04-08 | End: 2025-04-10 | Stop reason: HOSPADM

## 2025-04-08 RX ORDER — ISOSORBIDE MONONITRATE 30 MG/1
30 TABLET, EXTENDED RELEASE ORAL DAILY
Status: DISCONTINUED | OUTPATIENT
Start: 2025-04-08 | End: 2025-04-10 | Stop reason: HOSPADM

## 2025-04-08 RX ORDER — GUAIFENESIN 600 MG/1
1200 TABLET, EXTENDED RELEASE ORAL 2 TIMES DAILY
Status: DISCONTINUED | OUTPATIENT
Start: 2025-04-08 | End: 2025-04-10 | Stop reason: HOSPADM

## 2025-04-08 RX ORDER — EZETIMIBE 10 MG/1
10 TABLET ORAL NIGHTLY
Status: DISCONTINUED | OUTPATIENT
Start: 2025-04-08 | End: 2025-04-10 | Stop reason: HOSPADM

## 2025-04-08 RX ORDER — NITROGLYCERIN 0.4 MG/1
0.4 TABLET SUBLINGUAL EVERY 5 MIN PRN
Status: DISCONTINUED | OUTPATIENT
Start: 2025-04-08 | End: 2025-04-10 | Stop reason: HOSPADM

## 2025-04-08 RX ORDER — DOCUSATE SODIUM 100 MG/1
200 CAPSULE, LIQUID FILLED ORAL DAILY PRN
Status: DISCONTINUED | OUTPATIENT
Start: 2025-04-08 | End: 2025-04-10 | Stop reason: HOSPADM

## 2025-04-08 RX ORDER — WARFARIN SODIUM 5 MG/1
5 TABLET ORAL DAILY
Status: DISCONTINUED | OUTPATIENT
Start: 2025-04-08 | End: 2025-04-10 | Stop reason: HOSPADM

## 2025-04-08 RX ORDER — ALBUTEROL SULFATE 90 UG/1
2 INHALANT RESPIRATORY (INHALATION) EVERY 2 HOUR PRN
Status: DISCONTINUED | OUTPATIENT
Start: 2025-04-08 | End: 2025-04-10 | Stop reason: HOSPADM

## 2025-04-08 RX ADMIN — IPRATROPIUM BROMIDE AND ALBUTEROL SULFATE 3 ML: .5; 3 SOLUTION RESPIRATORY (INHALATION) at 18:45

## 2025-04-08 RX ADMIN — LEVOTHYROXINE SODIUM 50 MCG: 0.05 TABLET ORAL at 05:51

## 2025-04-08 RX ADMIN — ATORVASTATIN CALCIUM 40 MG: 40 TABLET, FILM COATED ORAL at 08:59

## 2025-04-08 RX ADMIN — Medication 4 L/MIN: at 09:18

## 2025-04-08 RX ADMIN — PERFLUTREN 2 ML OF DILUTION: 6.52 INJECTION, SUSPENSION INTRAVENOUS at 09:37

## 2025-04-08 RX ADMIN — EZETIMIBE 10 MG: 10 TABLET ORAL at 20:09

## 2025-04-08 RX ADMIN — EZETIMIBE 10 MG: 10 TABLET ORAL at 02:50

## 2025-04-08 RX ADMIN — PANTOPRAZOLE SODIUM 40 MG: 40 TABLET, DELAYED RELEASE ORAL at 05:51

## 2025-04-08 RX ADMIN — ISOSORBIDE MONONITRATE 30 MG: 30 TABLET, EXTENDED RELEASE ORAL at 08:59

## 2025-04-08 RX ADMIN — AZITHROMYCIN MONOHYDRATE 500 MG: 500 INJECTION, POWDER, LYOPHILIZED, FOR SOLUTION INTRAVENOUS at 20:09

## 2025-04-08 RX ADMIN — INSULIN HUMAN 2 UNITS: 100 INJECTION, SOLUTION PARENTERAL at 17:32

## 2025-04-08 RX ADMIN — GUAIFENESIN 1200 MG: 600 TABLET, EXTENDED RELEASE ORAL at 20:09

## 2025-04-08 RX ADMIN — FUROSEMIDE 40 MG: 10 INJECTION, SOLUTION INTRAMUSCULAR; INTRAVENOUS at 05:51

## 2025-04-08 RX ADMIN — MAGNESIUM SULFATE HEPTAHYDRATE 2 G: 40 INJECTION, SOLUTION INTRAVENOUS at 09:03

## 2025-04-08 RX ADMIN — BISOPROLOL FUMARATE 5 MG: 5 TABLET ORAL at 08:59

## 2025-04-08 RX ADMIN — IPRATROPIUM BROMIDE AND ALBUTEROL SULFATE 3 ML: .5; 3 SOLUTION RESPIRATORY (INHALATION) at 13:21

## 2025-04-08 RX ADMIN — GUAIFENESIN 1200 MG: 600 TABLET, EXTENDED RELEASE ORAL at 09:03

## 2025-04-08 RX ADMIN — HYDROCHLOROTHIAZIDE: 12.5 TABLET ORAL at 08:58

## 2025-04-08 RX ADMIN — BUDESONIDE 0.5 MG: 0.5 INHALANT ORAL at 18:44

## 2025-04-08 RX ADMIN — WARFARIN SODIUM 5 MG: 5 TABLET ORAL at 17:32

## 2025-04-08 RX ADMIN — FLUTICASONE PROPIONATE 2 SPRAY: 50 SPRAY, METERED NASAL at 08:59

## 2025-04-08 RX ADMIN — FUROSEMIDE 40 MG: 10 INJECTION, SOLUTION INTRAMUSCULAR; INTRAVENOUS at 17:32

## 2025-04-08 RX ADMIN — Medication 4 L/MIN: at 20:17

## 2025-04-08 RX ADMIN — PREDNISONE 40 MG: 20 TABLET ORAL at 08:58

## 2025-04-08 RX ADMIN — BUDESONIDE 0.5 MG: 0.5 INHALANT ORAL at 07:59

## 2025-04-08 RX ADMIN — CEFTRIAXONE SODIUM 1 G: 1 INJECTION, SOLUTION INTRAVENOUS at 20:09

## 2025-04-08 RX ADMIN — IPRATROPIUM BROMIDE AND ALBUTEROL SULFATE 3 ML: .5; 3 SOLUTION RESPIRATORY (INHALATION) at 07:59

## 2025-04-08 SDOH — SOCIAL STABILITY: SOCIAL INSECURITY
WITHIN THE LAST YEAR, HAVE YOU BEEN KICKED, HIT, SLAPPED, OR OTHERWISE PHYSICALLY HURT BY YOUR PARTNER OR EX-PARTNER?: PATIENT DECLINED

## 2025-04-08 SDOH — SOCIAL STABILITY: SOCIAL INSECURITY
WITHIN THE LAST YEAR, HAVE YOU BEEN HUMILIATED OR EMOTIONALLY ABUSED IN OTHER WAYS BY YOUR PARTNER OR EX-PARTNER?: PATIENT DECLINED

## 2025-04-08 SDOH — ECONOMIC STABILITY: INCOME INSECURITY
IN THE PAST 12 MONTHS HAS THE ELECTRIC, GAS, OIL, OR WATER COMPANY THREATENED TO SHUT OFF SERVICES IN YOUR HOME?: PATIENT DECLINED

## 2025-04-08 SDOH — ECONOMIC STABILITY: FOOD INSECURITY
WITHIN THE PAST 12 MONTHS, YOU WORRIED THAT YOUR FOOD WOULD RUN OUT BEFORE YOU GOT THE MONEY TO BUY MORE.: PATIENT DECLINED

## 2025-04-08 SDOH — SOCIAL STABILITY: SOCIAL INSECURITY
WITHIN THE LAST YEAR, HAVE YOU BEEN RAPED OR FORCED TO HAVE ANY KIND OF SEXUAL ACTIVITY BY YOUR PARTNER OR EX-PARTNER?: PATIENT DECLINED

## 2025-04-08 SDOH — SOCIAL STABILITY: SOCIAL INSECURITY: WITHIN THE LAST YEAR, HAVE YOU BEEN AFRAID OF YOUR PARTNER OR EX-PARTNER?: PATIENT DECLINED

## 2025-04-08 SDOH — ECONOMIC STABILITY: FOOD INSECURITY: WITHIN THE PAST 12 MONTHS, THE FOOD YOU BOUGHT JUST DIDN'T LAST AND YOU DIDN'T HAVE MONEY TO GET MORE.: PATIENT DECLINED

## 2025-04-08 SDOH — SOCIAL STABILITY: SOCIAL INSECURITY: WERE YOU ABLE TO COMPLETE ALL THE BEHAVIORAL HEALTH SCREENINGS?: YES

## 2025-04-08 SDOH — SOCIAL STABILITY: SOCIAL INSECURITY: ABUSE: ADULT

## 2025-04-08 SDOH — SOCIAL STABILITY: SOCIAL INSECURITY: HAVE YOU HAD THOUGHTS OF HARMING ANYONE ELSE?: NO

## 2025-04-08 ASSESSMENT — COGNITIVE AND FUNCTIONAL STATUS - GENERAL
TURNING FROM BACK TO SIDE WHILE IN FLAT BAD: A LITTLE
PATIENT BASELINE BEDBOUND: NO
DRESSING REGULAR LOWER BODY CLOTHING: A LITTLE
MOBILITY SCORE: 15
DAILY ACTIVITIY SCORE: 19
HELP NEEDED FOR BATHING: A LITTLE
CLIMB 3 TO 5 STEPS WITH RAILING: A LOT
WALKING IN HOSPITAL ROOM: A LOT
PERSONAL GROOMING: A LITTLE
TURNING FROM BACK TO SIDE WHILE IN FLAT BAD: A LITTLE
MOVING TO AND FROM BED TO CHAIR: A LITTLE
MOVING FROM LYING ON BACK TO SITTING ON SIDE OF FLAT BED WITH BEDRAILS: A LITTLE
MOVING TO AND FROM BED TO CHAIR: A LITTLE
STANDING UP FROM CHAIR USING ARMS: A LOT
DAILY ACTIVITIY SCORE: 19
DRESSING REGULAR UPPER BODY CLOTHING: A LITTLE
MOVING FROM LYING ON BACK TO SITTING ON SIDE OF FLAT BED WITH BEDRAILS: A LITTLE
MOBILITY SCORE: 15
TOILETING: A LITTLE
WALKING IN HOSPITAL ROOM: A LOT
HELP NEEDED FOR BATHING: A LITTLE
DRESSING REGULAR UPPER BODY CLOTHING: A LITTLE
TOILETING: A LITTLE
CLIMB 3 TO 5 STEPS WITH RAILING: A LOT
PERSONAL GROOMING: A LITTLE
DRESSING REGULAR LOWER BODY CLOTHING: A LITTLE
STANDING UP FROM CHAIR USING ARMS: A LOT

## 2025-04-08 ASSESSMENT — PATIENT HEALTH QUESTIONNAIRE - PHQ9
SUM OF ALL RESPONSES TO PHQ9 QUESTIONS 1 & 2: 0
2. FEELING DOWN, DEPRESSED OR HOPELESS: NOT AT ALL
1. LITTLE INTEREST OR PLEASURE IN DOING THINGS: NOT AT ALL

## 2025-04-08 ASSESSMENT — PAIN SCALES - GENERAL
PAINLEVEL_OUTOF10: 0 - NO PAIN

## 2025-04-08 ASSESSMENT — ACTIVITIES OF DAILY LIVING (ADL)
HEARING - LEFT EAR: FUNCTIONAL
LACK_OF_TRANSPORTATION: PATIENT DECLINED
ADEQUATE_TO_COMPLETE_ADL: YES
BATHING: NEEDS ASSISTANCE
JUDGMENT_ADEQUATE_SAFELY_COMPLETE_DAILY_ACTIVITIES: YES
HEARING - RIGHT EAR: FUNCTIONAL
TOILETING: NEEDS ASSISTANCE
DRESSING YOURSELF: NEEDS ASSISTANCE
FEEDING YOURSELF: INDEPENDENT
ASSISTIVE_DEVICE: OTHER (COMMENT)
PATIENT'S MEMORY ADEQUATE TO SAFELY COMPLETE DAILY ACTIVITIES?: YES
WALKS IN HOME: NEEDS ASSISTANCE
GROOMING: INDEPENDENT

## 2025-04-08 ASSESSMENT — ENCOUNTER SYMPTOMS
NEUROLOGICAL NEGATIVE: 1
WHEEZING: 1
SHORTNESS OF BREATH: 1
ACTIVITY CHANGE: 1
CHILLS: 1
MUSCULOSKELETAL NEGATIVE: 1
ALLERGIC/IMMUNOLOGIC NEGATIVE: 1
GASTROINTESTINAL NEGATIVE: 1
COUGH: 1
ENDOCRINE NEGATIVE: 1
EYES NEGATIVE: 1
CONFUSION: 1

## 2025-04-08 ASSESSMENT — PAIN - FUNCTIONAL ASSESSMENT
PAIN_FUNCTIONAL_ASSESSMENT: 0-10
PAIN_FUNCTIONAL_ASSESSMENT: 0-10

## 2025-04-08 ASSESSMENT — LIFESTYLE VARIABLES
HOW OFTEN DO YOU HAVE 6 OR MORE DRINKS ON ONE OCCASION: PATIENT DECLINED
HOW MANY STANDARD DRINKS CONTAINING ALCOHOL DO YOU HAVE ON A TYPICAL DAY: PATIENT DECLINED
HOW OFTEN DO YOU HAVE A DRINK CONTAINING ALCOHOL: PATIENT DECLINED
AUDIT-C TOTAL SCORE: -1
AUDIT-C TOTAL SCORE: -1
SKIP TO QUESTIONS 9-10: 0

## 2025-04-08 NOTE — PROGRESS NOTES
"Rufino Obrien is a 69 y.o. male on day 1 of admission presenting with Shortness of breath.        Subjective   Seen this AM.  Resting comfortably.  Answer questions but wants to rest.   Remains on O2.  Still with wheezing and congestion noted on exam.  Will add Mucinex scheduled.   Cont. Diuresis as BP and labs tolerate.         Objective     Last Recorded Vitals  /75 (BP Location: Right arm, Patient Position: Lying)   Pulse 100   Temp 36.1 °C (97 °F) (Temporal)   Resp 18   Ht 1.854 m (6' 1\")   Wt 120 kg (264 lb 8.8 oz)   SpO2 98%   BMI 34.90 kg/m²     Intake/Output last 3 Shifts:  I/O last 3 completed shifts:  In: 455.4 (3.8 mL/kg) [P.O.:120; I.V.:35.4 (0.3 mL/kg); IV Piggyback:300]  Out: 2050 (17.1 mL/kg) [Urine:2050 (0.5 mL/kg/hr)]  Weight: 120 kg       =========    RELEVANT RESULTS      ==========    Labs  Lab Results   Component Value Date    WBC 8.6 04/08/2025    HGB 10.4 (L) 04/08/2025    HCT 32.5 (L) 04/08/2025    MCV 96 04/08/2025     04/08/2025     Lab Results   Component Value Date    GLUCOSE 104 (H) 04/08/2025    CALCIUM 8.8 04/08/2025     (L) 04/08/2025    K 4.0 04/08/2025    CO2 27 04/08/2025    CL 99 04/08/2025    BUN 28 (H) 04/08/2025    CREATININE 1.60 (H) 04/08/2025      Lab Results   Component Value Date    ALT 10 04/07/2025    AST 13 04/07/2025    ALKPHOS 69 04/07/2025    BILITOT 1.1 04/07/2025          Exam     GENERAL:   no distress, alert and cooperative  HEENT: Normal Inspection, Mucous membranes moist, No JVD, No Lymphadenopathy  CARDIOVASCULAR: RRR, no murmurs, 2+ equal pulses of the extremities, normal S1 and S 2  RESPIRATORY: Patent airways, diffuse mod wheezing and congestion noted   ABDOMEN: Soft, Non-Tender, Normal Bowel Sounds, No Distention  SKIN: Warm and dry, no lesions, no rashes  EXTREMITIES: normal extremities, No significant LE Edema,  no rashes/lesions  NEURO: A&O x 3, CN II-XII grossly intact  PSYCH: Appropriate mood and behavior        =======   "   SCHEDULED MEDICATIONS     =======  Scheduled medications   Medication Dose Route Frequency    [Held by provider] allopurinol  300 mg oral Daily    atorvastatin  40 mg oral Daily    azithromycin  500 mg intravenous q24h    bisoprolol  5 mg oral Daily    budesonide  0.5 mg nebulization BID    cefTRIAXone  1 g intravenous q24h    ezetimibe  10 mg oral Nightly    fluticasone  2 spray Each Nostril Daily    furosemide  40 mg intravenous q12h    [Held by provider] gabapentin  300 mg oral TID    guaiFENesin  1,200 mg oral BID    insulin regular  0-10 Units subcutaneous TID AC    ipratropium-albuteroL  3 mL nebulization TID    isosorbide mononitrate ER  30 mg oral Daily    levothyroxine  50 mcg oral Daily    lisinopril 20 mg, hydroCHLOROthiazide 12.5 mg for Zestoretic/Prinizide   oral Daily    oxygen   inhalation Continuous - Inhalation    pantoprazole  40 mg oral Daily    predniSONE  40 mg oral Daily    warfarin  5 mg oral Daily       ==========     PRN MEDICATIONS      =========  albuterol, 2 puff, q2h PRN  docusate sodium, 200 mg, Daily PRN  nitroglycerin, 0.4 mg, q5 min PRN  polyethylene glycol, 17 g, Daily PRN        ==============     DIET     ==============  Dietary Orders (From admission, onward)       Start     Ordered    04/08/25 0749  Adult diet Cardiac, Consistent Carb; CCD 75 gm/meal; 1500 mL fluid; 70 gm fat; 2 - 3 grams Sodium  Diet effective now        Question Answer Comment   Diet type Cardiac    Diet type Consistent Carb    Carb diet selection: CCD 75 gm/meal    Dietary fluid restriction / 24h: 1500 mL fluid    Fat restriction: 70 gm fat    Sodium restriction: 2 - 3 grams Sodium        04/08/25 0748    04/08/25 0259  May Participate in Room Service  ( ROOM SERVICE MAY PARTICIPATE)  Once        Question:  .  Answer:  Yes    04/08/25 0258                    ======    Assessment/Plan      =======    ASSESSMENT:  Assessment & Plan  Shortness of  breath        ___________________________________________________    Acute on chronic CHF Exacerbation  Acute Hypoxic Respiratory failure due to CHF  Acute COPD Exacerbation  Suspected Superimposed Pneumonia  Type II MI  Subtherapeutic INR  Hypomagnesemia  Afib on Coumadin  DLP  CAD  DMII With complications of neuropathy  GERD  JONES  CKD Baseline 1.5              PLAN:    Cont. IV lasix for now.    Cont. Empiric Abx for now.   Daily INR  Replace Mg  CKD At baseline Cr.   No TTE on file.  Ordered and Pending        DVT Prophylaxis  Last Anticoag Admin            Orders not given:    warfarin (Coumadin) tablet 5 mg            SUMMARY/DISPOSITION  Remains on O2.  Still with wheezing on exam.  Cont. Regimen as above with anticipation for DC to home when ready.        Disclaimer: The timestamp of this note indicates when it was documented and does not necessarily correspond to the time the patient was evaluated or seen.        Stephen Tijerina, DO

## 2025-04-08 NOTE — PROGRESS NOTES
04/08/25 1215   Discharge Planning   Living Arrangements Family members   Support Systems Family members   Assistance Needed Independent, uses cane. Patient sister transports him to appointments or he uses transportation services through [facility]   Type of Residence Private residence  (2 story home. patient stays on first floor)   Number of Stairs to Enter Residence 3   Home or Post Acute Services None   Expected Discharge Disposition Home     Met with patient at bedside, introduced self and role on care transitions team. Admission assessment completed with patient. Address, insurance and contact information verified. Patient lives at home with his sister. Patient is diabetic, states he does not check his blood sugar at home, confirms he has a working glucometer at home. Wears CPAP at night. Patient plans to return home at discharge.    PCP: Dr. Bradford Ji, last visit was a few weeks ago

## 2025-04-08 NOTE — CARE PLAN
The patient's goals for the shift include      The clinical goals for the shift include remain hds      Problem: Pain - Adult  Goal: Verbalizes/displays adequate comfort level or baseline comfort level  Outcome: Progressing     Problem: Safety - Adult  Goal: Free from fall injury  Outcome: Progressing     Problem: Discharge Planning  Goal: Discharge to home or other facility with appropriate resources  Outcome: Progressing     Problem: Chronic Conditions and Co-morbidities  Goal: Patient's chronic conditions and co-morbidity symptoms are monitored and maintained or improved  Outcome: Progressing     Problem: Nutrition  Goal: Nutrient intake appropriate for maintaining nutritional needs  Outcome: Progressing       Over the shift, the patient did not make progress toward the following goals. Barriers to progression include

## 2025-04-08 NOTE — H&P
Chief Complaint   Patient presents with    Chest Pain    Shortness of Breath       HPI    Rufino Obrien is a 69 y.o. male with a PMHx of atrial flutter on Coumadin, HTN, HLD, CHF , Active smoker ,COPD, DM, ,PAD, right toe amputation, morbid obesity, diabetic neuropathy, GERD, Obstructive sleep apnea, CPAP , gout, hypothyroidism, CAD who presented to [facility] on 4/7/2025 with a chief complaint of shortness of breath.  Patient was accompanied by his wife who helped with the history.  Patient was having progressive shortness of breath, the became worse over the last 4 days.  He was rapidly breathing and uncomfortable according to his wife.  He slept only for 8 hours over the last week due to his shortness of breath and he was sitting in the recliner.  On the day of admission, his wife felt that he was disoriented and heavily breathing.  He was having cough, but was not coming up with phlegm, he was having chills which is his baseline, felt warm but no measured fever. He was having mild chest discomfort.  He had nausea and was gagging, vomited once. She stated he has LE edema.  He denies any headaches, change in vision, difficulty swallowing, change in hearing,  abdominal pain, weight change, change in bowel habits/urine, joint pain/swelling, weakness in any of the extremities. He lives with his wife  , he is compliant with his medications and feels safe at home.     ROS: 10 point review of systems negative with the exception of above.    ED Course:  In the ED labs are significant for serum creatinine 1.45, magnesium 1.06, PT/INR 15.1/1.4, , viral panel negative, imaging chest x ray Findings suggestive of a degree of pulmonary edema/CHF. CT chest  Small infiltrate in the right lung base with additional small  areas of patchy atelectasis and signs of COPD,  Cardiomegaly with severe atherosclerotic coronary artery calcifications, Nonspecific mediastinal adenopathy. This can be reactive or related to neoplastic  process  ED Triage Vitals   Temperature Heart Rate Respirations BP   04/07/25 1814 04/07/25 1814 04/07/25 1814 04/07/25 1814   35.8 °C (96.4 °F) (!) 104 (!) 24 (!) 163/102      Pulse Ox Temp src Heart Rate Source Patient Position   04/07/25 1814 -- 04/07/25 2000 04/07/25 2000   96 %  Monitor Lying      BP Location FiO2 (%)     04/07/25 2000 --     Right arm          Labs:  Abnormal Labs Reviewed   CBC WITH AUTO DIFFERENTIAL - Abnormal; Notable for the following components:       Result Value    RBC 3.73 (*)     Hemoglobin 11.4 (*)     Hematocrit 36.7 (*)     MCHC 31.1 (*)     RDW 17.3 (*)     All other components within normal limits   COMPREHENSIVE METABOLIC PANEL - Abnormal; Notable for the following components:    Glucose 109 (*)     Sodium 135 (*)     Urea Nitrogen 26 (*)     Creatinine 1.45 (*)     eGFR 52 (*)     Albumin 3.0 (*)     All other components within normal limits   MAGNESIUM - Abnormal; Notable for the following components:    Magnesium 1.06 (*)     All other components within normal limits   PROTIME-INR - Abnormal; Notable for the following components:    Protime 15.1 (*)     INR 1.4 (*)     All other components within normal limits   B-TYPE NATRIURETIC PEPTIDE - Abnormal; Notable for the following components:     (*)     All other components within normal limits    Narrative:        <100 pg/mL - Heart failure unlikely  100-299 pg/mL - Intermediate probability of acute heart                  failure exacerbation. Correlate with clinical                  context and patient history.    >=300 pg/mL - Heart Failure likely. Correlate with clinical                  context and patient history.    BNP testing is performed using different testing methodology at [facility] than at other [facility]. Direct result comparisons should only be made within the same method.      SERIAL TROPONIN-INITIAL - Abnormal; Notable for the following components:    Troponin I, High Sensitivity 48  (*)     All other components within normal limits    Narrative:     Less than 99th percentile of normal range cutoff-  Female and children under 18 years old <14 ng/L; Male <21 ng/L: Negative  Repeat testing should be performed if clinically indicated.     Female and children under 18 years old 14-50 ng/L; Male 21-50 ng/L:  Consistent with possible cardiac damage and possible increased clinical   risk. Serial measurements may help to assess extent of myocardial damage.     >50 ng/L: Consistent with cardiac damage, increased clinical risk and  myocardial infarction. Serial measurements may help assess extent of   myocardial damage.      NOTE: Children less than 1 year old may have higher baseline troponin   levels and results should be interpreted in conjunction with the overall   clinical context.     NOTE: Troponin I testing is performed using a different   testing methodology at [facility] than at other   [facility]. Direct result comparisons should only   be made within the same method.   SERIAL TROPONIN, 1 HOUR - Abnormal; Notable for the following components:    Troponin I, High Sensitivity 48 (*)     All other components within normal limits    Narrative:     Less than 99th percentile of normal range cutoff-  Female and children under 18 years old <14 ng/L; Male <21 ng/L: Negative  Repeat testing should be performed if clinically indicated.     Female and children under 18 years old 14-50 ng/L; Male 21-50 ng/L:  Consistent with possible cardiac damage and possible increased clinical   risk. Serial measurements may help to assess extent of myocardial damage.     >50 ng/L: Consistent with cardiac damage, increased clinical risk and  myocardial infarction. Serial measurements may help assess extent of   myocardial damage.      NOTE: Children less than 1 year old may have higher baseline troponin   levels and results should be interpreted in conjunction with the overall   clinical context.      NOTE: Troponin I testing is performed using a different   testing methodology at [facility] than at other   [facility] hospitals. Direct result comparisons should only   be made within the same method.        No orders to display       XR chest 1 view   Final Result   Findings suggestive of a degree of pulmonary edema/CHF.        MACRO:   None        Signed by: Kurtis Charles 4/7/2025 7:22 PM   Dictation workstation:   [id number]        XR chest 1 view   Final Result   Findings suggestive of a degree of pulmonary edema/CHF.        MACRO:   None        Signed by: Kurtis Charles 4/7/2025 7:22 PM   Dictation workstation:   [id number]              Intervention: In ED, patient received   Medications   azithromycin (Zithromax) 500 mg in dextrose 5%  mL (500 mg intravenous New Bag 4/7/25 2058)   magnesium sulfate 2 g in sterile water for injection 50 mL (has no administration in time range)   ipratropium-albuteroL (Duo-Neb) 0.5-2.5 mg/3 mL nebulizer solution 3 mL (3 mL nebulization Given 4/7/25 1856)   cefTRIAXone (Rocephin) 2 g in dextrose (iso) IV 50 mL (0 g intravenous Stopped 4/7/25 2057)   furosemide (Lasix) injection 40 mg (40 mg intravenous Given 4/7/25 2021)      Patient was then transferred to the floor for further management      Meds:   Modified Medications    No medications on file       Follows up with Dr. Bradford Ji DO      Past Medical History     Past Medical History:   Diagnosis Date    Pain in left hip 12/29/2020    Left hip pain    Personal history of other diseases of the circulatory system     History of atrial fibrillation      Surgical History     Past Surgical History:   Procedure Laterality Date    ANKLE SURGERY  12/14/2016    Ankle Surgery    CHOLECYSTECTOMY  12/12/2016    Cholecystectomy Laparoscopic    OTHER SURGICAL HISTORY  10/23/2021    Complete colonoscopy    OTHER SURGICAL HISTORY  10/18/2018    Incisional Hernia Repair     Family History     Family  History   Problem Relation Name Age of Onset    Hypertension Mother      Diabetes Father      Prostate cancer Father      Hypertension Sister      Hyperlipidemia Sister      Diabetes Sister      Sleep apnea Sister      Heart attack Brother       Social History     Tobacco Use: High Risk (3/20/2025)    Patient History     Smoking Tobacco Use: Every Day     Smokeless Tobacco Use: Never     Passive Exposure: Current      Social History     Substance and Sexual Activity   Alcohol Use Never      Allergies   No Known Allergies   Meds    Scheduled medications  azithromycin, 500 mg, intravenous, Once  magnesium sulfate, 2 g, intravenous, Once      Continuous medications     PRN medications     Objective     Vitals  Visit Vitals  BP (!) 154/119 (BP Location: Right arm, Patient Position: Sitting)   Pulse (!) 106   Temp 35.8 °C (96.4 °F)   Resp (!) 22   SpO2 98%   Smoking Status Every Day        Review of Systems   Constitutional:  Positive for activity change and chills.   HENT: Negative.     Eyes: Negative.    Respiratory:  Positive for cough, shortness of breath and wheezing.    Cardiovascular:  Positive for leg swelling.   Gastrointestinal: Negative.    Endocrine: Negative.    Genitourinary: Negative.    Musculoskeletal: Negative.    Skin: Negative.    Allergic/Immunologic: Negative.    Neurological: Negative.    Psychiatric/Behavioral:  Positive for confusion (brief).      Temperature:  [35.8 °C (96.4 °F)] 35.8 °C (96.4 °F)  Heart Rate:  [] 106  Respirations:  [22-24] 22  BP: (154-164)/(102-119) 154/119  No intake/output data recorded.  I/O this shift:  In: 50 [IV Piggyback:50]  Out: 675 [Urine:675]  Physical Exam  Constitutional:       General: He is in acute distress.      Appearance: He is obese. He is ill-appearing.   HENT:      Head: Normocephalic and atraumatic.      Nose: Nose normal.   Eyes:      Extraocular Movements: Extraocular movements intact.      Conjunctiva/sclera: Conjunctivae normal.      Pupils:  "Pupils are equal, round, and reactive to light.   Cardiovascular:      Rate and Rhythm: Tachycardia present. Rhythm irregular.      Pulses: Normal pulses.      Heart sounds: Normal heart sounds.   Pulmonary:      Breath sounds: Decreased air movement present. Wheezing and rhonchi present.   Abdominal:      General: Abdomen is flat. Bowel sounds are normal.      Palpations: Abdomen is soft.   Musculoskeletal:         General: Normal range of motion.      Cervical back: Normal range of motion and neck supple.      Right lower leg: Edema present.      Left lower leg: Edema present.   Skin:     General: Skin is warm and dry.   Neurological:      General: No focal deficit present.      Mental Status: He is alert and oriented to person, place, and time. Mental status is at baseline.   Psychiatric:         Mood and Affect: Mood normal.         Behavior: Behavior normal.       Assessment & Plan          I/Os    Intake/Output Summary (Last 24 hours) at 4/7/2025 2133  Last data filed at 4/7/2025 2108  Gross per 24 hour   Intake 50 ml   Output 675 ml   Net -625 ml         Labs:   Results from last 72 hours   Lab Units 04/07/25  1831   SODIUM mmol/L 135*   POTASSIUM mmol/L 4.0   CHLORIDE mmol/L 101   CO2 mmol/L 24   BUN mg/dL 26*   CREATININE mg/dL 1.45*   GLUCOSE mg/dL 109*   CALCIUM mg/dL 8.7   ANION GAP mmol/L 14   EGFR mL/min/1.73m*2 52*      Results from last 72 hours   Lab Units 04/07/25  1831   WBC AUTO x10*3/uL 8.6   HEMOGLOBIN g/dL 11.4*   HEMATOCRIT % 36.7*   PLATELETS AUTO x10*3/uL 235   NEUTROS PCT AUTO % 69.2   LYMPHS PCT AUTO % 17.7   MONOS PCT AUTO % 9.5   EOS PCT AUTO % 2.4      Lab Results   Component Value Date    CALCIUM 8.7 04/07/2025    PHOS 2.7 07/21/2020      No results found for: \"CRP\"   [unfilled]     Micro/ID:   No results found for the last 90 days.                   No lab exists for component: \"AGALPCRNB\"   .ID  No results found for: \"URINECULTURE\", \"BLOODCULT\", \"CSFCULTSMEAR\"  Images    XR " chest 1 view  Narrative: Interpreted By:  Kurtis Charles,   STUDY:  Chest dated  4/7/2025.      INDICATION:  Signs/Symptoms:Chest Pain      COMPARISON:  Chest dated 12/22/2021.      ACCESSION NUMBER(S):  [id number]      ORDERING CLINICIAN:  CIRILO MARIE      TECHNIQUE:  One view of the chest.      FINDINGS:  Ill-defined prominence is seen of the pulmonary interstitium.  No  pneumothorax or effusion is evident. The cardiomediastinal silhouette  is  enlarged but similar to the prior exam.The soft tissues are  grossly unremarkable.      Impression: Findings suggestive of a degree of pulmonary edema/CHF.      MACRO:  None      Signed by: Kurtis Charles 4/7/2025 7:22 PM  Dictation workstation:   [id number]    Assessment and Plan    Rufino Obrien is a 69 y.o. male admitted for acute hypoxic respiratory failure.    Acute Medical Issues   # CHF exacerbation  #Possible COPD exacerbation:  # Acute hypoxic respiratory failure in the setting of the above:  - On 2 L liters nasal cannula on admission, baseline room air.  -Daily weights, Strict I/O, 1500cc fluid restriction, Cardiac diet (low Na)  -Lasix 40 mg IV BID  - , CXR pulmonary edema and pleural effusion.   -No echo on file , Ordered TTE; pending   -Duonebs q4, Albuterol q2 PRN, RT evaluate and treat, Pulmicort.   -Azithromycin 500 for 3 days  -Solumedrol 125mg , Prednisone 40 mg daily for 5 days  -Bronchial hygiene, Incentive Spirometry  - CT showing small right lung basal infiltrates, will cover empirically with ceftriaxone, patient had some low-grade fever at home with chills and mild productive cough, Pro-Alejo ordered; pending.      #sepsis:  - SIRS 2/4; Tachyacrdia and tachypnea    #NSTEMI type II:  - Serum troponin 48 x 2, third pending EKG with no acute ST/T wave abnormalities.  - Likely secondary to demand ischemia type II MI.      # Subtherapeutic INR:  - PT/INR on admission 15.1/1.4  - On warfarin 5 mg daily, goal 2-3, according to the wife he  is taking it religiously.      #Hypomagnesemia:  - Magnesium severely decreased, 1.06, repleted in the ED  - Continue to monitor    # Active smoker:  - Counseled about smoking cessation.    Chronic Medical Issues   #Gout:  - Holding allopurinol, 300 mg, oral, Daily temporary, resume with the stable kidney function.    #HLD/CAD/atrial flutter/fibrillation:  -Continue atorvastatin, 40 mg, oral, Daily, bisoprolol, 5 mg, oral, Daily, ezetimibe, 10 mg, oral, Nightly, isosorbide mononitrate ER, 30 mg, oral, Daily, lisinopril 20 mg, hydroCHLOROthiazide 12.5 mg for Zestoretic/Prinizide, , oral, Daily  -warfarin, 5 mg, oral, Daily    #Allergic rhinitis:  fluticasone, 2 spray, Each Nostril, Daily      #Diabetic neuropathy:  Hold  gabapentin, 300 mg, oral, TID, resume with stable kidney function.    #DM:  -Patient on Trulicity, continue SSI      #Hypothyroidism:  -Continue levothyroxine, 50 mcg, oral, Daily    #GERD:  -Continue pantoprazole, 40 mg, oral, Daily      #JONES:  - CPAP at night    F: 1.5 L fluid restriction  E: Replete as needed  N: Cardiac diabetic  diet  GI ppx: Protonix 40 mg daily   DVT ppx: Warfarin  Antibiotics: Ceftriaxone and azithromycin  Oxygenation: 2 L nasal cannula, CPAP at night    Code Status: No Order   Emergency Contact: Extended Emergency Contact Information  Primary Emergency Contact: Sheela Obrien  Home Phone: [phone]  Relation: Spouse     Disposition: 69 y.o.male admitted for acute hypoxic respiratory failure secondary to CHF and possible COPD exacerbation, anticipate LOS > 2 midnights.         Tez Elam  Internal Medicine, Hospitalist   [facility]  [place]

## 2025-04-08 NOTE — CARE PLAN
Problem: Pain - Adult  Goal: Verbalizes/displays adequate comfort level or baseline comfort level  Outcome: Progressing     Problem: Safety - Adult  Goal: Free from fall injury  Outcome: Progressing     Problem: Chronic Conditions and Co-morbidities  Goal: Patient's chronic conditions and co-morbidity symptoms are monitored and maintained or improved  Outcome: Progressing     Problem: Nutrition  Goal: Nutrient intake appropriate for maintaining nutritional needs  Outcome: Progressing     Problem: Diabetes  Goal: Achieve decreasing blood glucose levels by end of shift  Outcome: Progressing     Problem: Diabetes  Goal: Increase stability of blood glucose readings by end of shift  Outcome: Progressing     Problem: Skin  Goal: Participates in plan/prevention/treatment measures  Outcome: Progressing     Problem: Skin  Goal: Prevent/manage excess moisture  Outcome: Progressing     Problem: Skin  Goal: Prevent/minimize sheer/friction injuries  Outcome: Progressing     Problem: Skin  Goal: Promote/optimize nutrition  Outcome: Progressing     Problem: Discharge Planning  Goal: Discharge to home or other facility with appropriate resources  Outcome: Progressing

## 2025-04-09 LAB
ALBUMIN SERPL BCP-MCNC: 2.8 G/DL (ref 3.4–5)
ANION GAP SERPL CALC-SCNC: 14 MMOL/L (ref 10–20)
ATRIAL RATE: 294 BPM
ATRIAL RATE: 294 BPM
BUN SERPL-MCNC: 46 MG/DL (ref 6–23)
CALCIUM SERPL-MCNC: 8.9 MG/DL (ref 8.6–10.3)
CHLORIDE SERPL-SCNC: 99 MMOL/L (ref 98–107)
CO2 SERPL-SCNC: 28 MMOL/L (ref 21–32)
CREAT SERPL-MCNC: 1.8 MG/DL (ref 0.5–1.3)
EGFRCR SERPLBLD CKD-EPI 2021: 40 ML/MIN/1.73M*2
ERYTHROCYTE [DISTWIDTH] IN BLOOD BY AUTOMATED COUNT: 17 % (ref 11.5–14.5)
GLUCOSE BLD MANUAL STRIP-MCNC: 148 MG/DL (ref 74–99)
GLUCOSE BLD MANUAL STRIP-MCNC: 172 MG/DL (ref 74–99)
GLUCOSE BLD MANUAL STRIP-MCNC: 213 MG/DL (ref 74–99)
GLUCOSE BLD MANUAL STRIP-MCNC: 275 MG/DL (ref 74–99)
GLUCOSE SERPL-MCNC: 153 MG/DL (ref 74–99)
HCT VFR BLD AUTO: 34.2 % (ref 41–52)
HGB BLD-MCNC: 11.1 G/DL (ref 13.5–17.5)
HOLD SPECIMEN: NORMAL
INR PPP: 1.2 (ref 0.9–1.1)
MAGNESIUM SERPL-MCNC: 1.45 MG/DL (ref 1.6–2.4)
MCH RBC QN AUTO: 30.9 PG (ref 26–34)
MCHC RBC AUTO-ENTMCNC: 32.5 G/DL (ref 32–36)
MCV RBC AUTO: 95 FL (ref 80–100)
NRBC BLD-RTO: 0 /100 WBCS (ref 0–0)
P AXIS: 225 DEGREES
PHOSPHATE SERPL-MCNC: 3.9 MG/DL (ref 2.5–4.9)
PLATELET # BLD AUTO: 209 X10*3/UL (ref 150–450)
POTASSIUM SERPL-SCNC: 3.9 MMOL/L (ref 3.5–5.3)
PR INTERVAL: 111 MS
PR INTERVAL: 122 MS
PROTHROMBIN TIME: 13.8 SECONDS (ref 9.8–12.4)
Q ONSET: 251 MS
Q ONSET: 254 MS
QRS COUNT: 17 BEATS
QRS COUNT: 17 BEATS
QRS DURATION: 110 MS
QRS DURATION: 110 MS
QT INTERVAL: 386 MS
QT INTERVAL: 388 MS
QTC CALCULATION(BAZETT): 511 MS
QTC CALCULATION(BAZETT): 513 MS
QTC FREDERICIA: 465 MS
QTC FREDERICIA: 467 MS
R AXIS: -8 DEGREES
R AXIS: 44 DEGREES
RBC # BLD AUTO: 3.59 X10*6/UL (ref 4.5–5.9)
SODIUM SERPL-SCNC: 137 MMOL/L (ref 136–145)
T AXIS: 123 DEGREES
T AXIS: 158 DEGREES
T OFFSET: 444 MS
T OFFSET: 448 MS
VENTRICULAR RATE: 105 BPM
VENTRICULAR RATE: 105 BPM
WBC # BLD AUTO: 10.1 X10*3/UL (ref 4.4–11.3)

## 2025-04-09 PROCEDURE — 99232 SBSQ HOSP IP/OBS MODERATE 35: CPT | Performed by: INTERNAL MEDICINE

## 2025-04-09 PROCEDURE — 36415 COLL VENOUS BLD VENIPUNCTURE: CPT | Performed by: INTERNAL MEDICINE

## 2025-04-09 PROCEDURE — 2500000004 HC RX 250 GENERAL PHARMACY W/ HCPCS (ALT 636 FOR OP/ED): Performed by: INTERNAL MEDICINE

## 2025-04-09 PROCEDURE — 84100 ASSAY OF PHOSPHORUS: CPT | Performed by: INTERNAL MEDICINE

## 2025-04-09 PROCEDURE — 2500000001 HC RX 250 WO HCPCS SELF ADMINISTERED DRUGS (ALT 637 FOR MEDICARE OP): Performed by: INTERNAL MEDICINE

## 2025-04-09 PROCEDURE — 2500000002 HC RX 250 W HCPCS SELF ADMINISTERED DRUGS (ALT 637 FOR MEDICARE OP, ALT 636 FOR OP/ED): Performed by: INTERNAL MEDICINE

## 2025-04-09 PROCEDURE — 83735 ASSAY OF MAGNESIUM: CPT | Performed by: INTERNAL MEDICINE

## 2025-04-09 PROCEDURE — 94660 CPAP INITIATION&MGMT: CPT

## 2025-04-09 PROCEDURE — 94640 AIRWAY INHALATION TREATMENT: CPT

## 2025-04-09 PROCEDURE — 2060000001 HC INTERMEDIATE ICU ROOM DAILY

## 2025-04-09 PROCEDURE — 82947 ASSAY GLUCOSE BLOOD QUANT: CPT

## 2025-04-09 PROCEDURE — 85027 COMPLETE CBC AUTOMATED: CPT | Performed by: INTERNAL MEDICINE

## 2025-04-09 PROCEDURE — 85610 PROTHROMBIN TIME: CPT | Performed by: INTERNAL MEDICINE

## 2025-04-09 RX ORDER — IPRATROPIUM BROMIDE AND ALBUTEROL SULFATE 2.5; .5 MG/3ML; MG/3ML
3 SOLUTION RESPIRATORY (INHALATION)
Status: DISCONTINUED | OUTPATIENT
Start: 2025-04-09 | End: 2025-04-10 | Stop reason: HOSPADM

## 2025-04-09 RX ORDER — MAGNESIUM SULFATE HEPTAHYDRATE 40 MG/ML
2 INJECTION, SOLUTION INTRAVENOUS ONCE
Status: COMPLETED | OUTPATIENT
Start: 2025-04-09 | End: 2025-04-09

## 2025-04-09 RX ADMIN — INSULIN HUMAN 4 UNITS: 100 INJECTION, SOLUTION PARENTERAL at 16:48

## 2025-04-09 RX ADMIN — ISOSORBIDE MONONITRATE 30 MG: 30 TABLET, EXTENDED RELEASE ORAL at 09:36

## 2025-04-09 RX ADMIN — PREDNISONE 40 MG: 20 TABLET ORAL at 09:36

## 2025-04-09 RX ADMIN — GABAPENTIN 300 MG: 300 CAPSULE ORAL at 16:52

## 2025-04-09 RX ADMIN — WARFARIN SODIUM 5 MG: 5 TABLET ORAL at 16:52

## 2025-04-09 RX ADMIN — AZITHROMYCIN MONOHYDRATE 500 MG: 500 INJECTION, POWDER, LYOPHILIZED, FOR SOLUTION INTRAVENOUS at 20:47

## 2025-04-09 RX ADMIN — BUDESONIDE 0.5 MG: 0.5 INHALANT ORAL at 07:31

## 2025-04-09 RX ADMIN — IPRATROPIUM BROMIDE AND ALBUTEROL SULFATE 3 ML: .5; 3 SOLUTION RESPIRATORY (INHALATION) at 20:13

## 2025-04-09 RX ADMIN — PANTOPRAZOLE SODIUM 40 MG: 40 TABLET, DELAYED RELEASE ORAL at 05:42

## 2025-04-09 RX ADMIN — GUAIFENESIN 1200 MG: 600 TABLET, EXTENDED RELEASE ORAL at 09:34

## 2025-04-09 RX ADMIN — EZETIMIBE 10 MG: 10 TABLET ORAL at 20:47

## 2025-04-09 RX ADMIN — FLUTICASONE PROPIONATE 2 SPRAY: 50 SPRAY, METERED NASAL at 09:36

## 2025-04-09 RX ADMIN — FUROSEMIDE 40 MG: 10 INJECTION, SOLUTION INTRAMUSCULAR; INTRAVENOUS at 05:42

## 2025-04-09 RX ADMIN — INSULIN HUMAN 1 UNITS: 100 INJECTION, SOLUTION PARENTERAL at 12:39

## 2025-04-09 RX ADMIN — MAGNESIUM SULFATE HEPTAHYDRATE 2 G: 40 INJECTION, SOLUTION INTRAVENOUS at 09:36

## 2025-04-09 RX ADMIN — IPRATROPIUM BROMIDE AND ALBUTEROL SULFATE 3 ML: .5; 3 SOLUTION RESPIRATORY (INHALATION) at 07:31

## 2025-04-09 RX ADMIN — BISOPROLOL FUMARATE 5 MG: 5 TABLET ORAL at 09:36

## 2025-04-09 RX ADMIN — ATORVASTATIN CALCIUM 40 MG: 40 TABLET, FILM COATED ORAL at 09:36

## 2025-04-09 RX ADMIN — BUDESONIDE 0.5 MG: 0.5 INHALANT ORAL at 20:13

## 2025-04-09 RX ADMIN — GUAIFENESIN 1200 MG: 600 TABLET, EXTENDED RELEASE ORAL at 20:47

## 2025-04-09 RX ADMIN — LEVOTHYROXINE SODIUM 50 MCG: 0.05 TABLET ORAL at 05:42

## 2025-04-09 RX ADMIN — GABAPENTIN 300 MG: 300 CAPSULE ORAL at 09:00

## 2025-04-09 RX ADMIN — CEFTRIAXONE SODIUM 1 G: 1 INJECTION, SOLUTION INTRAVENOUS at 20:47

## 2025-04-09 RX ADMIN — GABAPENTIN 300 MG: 300 CAPSULE ORAL at 20:47

## 2025-04-09 ASSESSMENT — COGNITIVE AND FUNCTIONAL STATUS - GENERAL
MOVING FROM LYING ON BACK TO SITTING ON SIDE OF FLAT BED WITH BEDRAILS: A LITTLE
WALKING IN HOSPITAL ROOM: A LOT
DAILY ACTIVITIY SCORE: 19
HELP NEEDED FOR BATHING: A LITTLE
DRESSING REGULAR UPPER BODY CLOTHING: A LITTLE
MOBILITY SCORE: 15
TOILETING: A LITTLE
CLIMB 3 TO 5 STEPS WITH RAILING: A LOT
STANDING UP FROM CHAIR USING ARMS: A LOT
MOVING TO AND FROM BED TO CHAIR: A LITTLE
TURNING FROM BACK TO SIDE WHILE IN FLAT BAD: A LITTLE
PERSONAL GROOMING: A LITTLE
DRESSING REGULAR LOWER BODY CLOTHING: A LITTLE

## 2025-04-09 ASSESSMENT — PAIN SCALES - GENERAL
PAINLEVEL_OUTOF10: 0 - NO PAIN
PAINLEVEL_OUTOF10: 0 - NO PAIN

## 2025-04-09 NOTE — PROGRESS NOTES
"Rufino Obrien is a 69 y.o. male on day 2 of admission presenting with Shortness of breath.        Subjective   Seen this AM.  Feeling better overall.   Weaned off of O2 this AM.  Still with wheezing on exam but overall greatly improved.   Will hold off on lasix this AM with BP soft and bump in Cr.   LE swelling improved and minimal at this point.          Objective     Last Recorded Vitals  /64 (BP Location: Right arm, Patient Position: Lying)   Pulse 81   Temp 36 °C (96.8 °F) (Temporal)   Resp 19   Ht 1.854 m (6' 1\")   Wt 113 kg (249 lb 1.9 oz)   SpO2 100%   BMI 32.87 kg/m²     Intake/Output last 3 Shifts:  I/O last 3 completed shifts:  In: 2376.7 (21 mL/kg) [P.O.:1680; I.V.:96.7 (0.9 mL/kg); IV Piggyback:600]  Out: 4960 (43.9 mL/kg) [Urine:4960 (1.2 mL/kg/hr)]  Weight: 113 kg       =========    RELEVANT RESULTS      ==========    Labs  Lab Results   Component Value Date    WBC 10.1 04/09/2025    HGB 11.1 (L) 04/09/2025    HCT 34.2 (L) 04/09/2025    MCV 95 04/09/2025     04/09/2025     Lab Results   Component Value Date    GLUCOSE 153 (H) 04/09/2025    CALCIUM 8.9 04/09/2025     04/09/2025    K 3.9 04/09/2025    CO2 28 04/09/2025    CL 99 04/09/2025    BUN 46 (H) 04/09/2025    CREATININE 1.80 (H) 04/09/2025      Lab Results   Component Value Date    ALT 10 04/07/2025    AST 13 04/07/2025    ALKPHOS 69 04/07/2025    BILITOT 1.1 04/07/2025          Exam     GENERAL:   no distress, alert and cooperative  HEENT: Normal Inspection, Mucous membranes moist, No JVD, No Lymphadenopathy  CARDIOVASCULAR: RRR, no murmurs, 2+ equal pulses of the extremities, normal S1 and S 2  RESPIRATORY: Patent airways, CTAB, thorax symmetric, No significant wheezing, Rales or Rhonchi  ABDOMEN: Soft, Non-Tender, Normal Bowel Sounds, No Distention  SKIN: Warm and dry, no lesions, no rashes  EXTREMITIES: normal extremities, no significant cyanosis edema, contusions or wounds, no obsvious clubbing  NEURO: A&O x 3, " CN II-XII grossly intact  PSYCH: Appropriate mood and behavior        =======     SCHEDULED MEDICATIONS     =======  Scheduled medications   Medication Dose Route Frequency    [Held by provider] allopurinol  300 mg oral Daily    atorvastatin  40 mg oral Daily    azithromycin  500 mg intravenous q24h    bisoprolol  5 mg oral Daily    budesonide  0.5 mg nebulization BID    cefTRIAXone  1 g intravenous q24h    ezetimibe  10 mg oral Nightly    fluticasone  2 spray Each Nostril Daily    [Held by provider] furosemide  40 mg intravenous q12h    gabapentin  300 mg oral TID    guaiFENesin  1,200 mg oral BID    insulin regular  0-10 Units subcutaneous TID AC    ipratropium-albuteroL  3 mL nebulization BID    isosorbide mononitrate ER  30 mg oral Daily    levothyroxine  50 mcg oral Daily    [Held by provider] lisinopril 20 mg, hydroCHLOROthiazide 12.5 mg for Zestoretic/Prinizide   oral Daily    magnesium sulfate  2 g intravenous Once    pantoprazole  40 mg oral Daily    predniSONE  40 mg oral Daily    warfarin  5 mg oral Daily       ==========     PRN MEDICATIONS      =========  albuterol, 2 puff, q2h PRN  docusate sodium, 200 mg, Daily PRN  nitroglycerin, 0.4 mg, q5 min PRN  oxygen, , Continuous PRN - O2/gases  polyethylene glycol, 17 g, Daily PRN        ==============     DIET     ==============  Dietary Orders (From admission, onward)       Start     Ordered    04/08/25 0749  Adult diet Cardiac, Consistent Carb; CCD 75 gm/meal; 1500 mL fluid; 70 gm fat; 2 - 3 grams Sodium  Diet effective now        Question Answer Comment   Diet type Cardiac    Diet type Consistent Carb    Carb diet selection: CCD 75 gm/meal    Dietary fluid restriction / 24h: 1500 mL fluid    Fat restriction: 70 gm fat    Sodium restriction: 2 - 3 grams Sodium        04/08/25 0748    04/08/25 0259  May Participate in Room Service  ( ROOM SERVICE MAY PARTICIPATE)  Once        Question:  .  Answer:  Yes    04/08/25 0258                    ======     Assessment/Plan      =======    ASSESSMENT:  Assessment & Plan  Shortness of breath        ___________________________________________________    Acute on chronic CHF Exacerbation - systolic - EF 35%  Acute Hypoxic Respiratory failure due to CHF  Acute COPD Exacerbation  Suspected Superimposed Pneumonia  Type II MI  Subtherapeutic INR  Hypomagnesemia  Afib on Coumadin  DLP  CAD  DMII With complications of neuropathy  GERD  JONES  Mild JADA on CKD Baseline 1.5            PLAN:    Hold AM dose of lasix with normalized BP and bump in Cr.   Will likely need low dose of loop diuretic on DC with reduced EF on TTE noted.    Follows with cardiology as outpatient.   No signs of ACS  Cont. Empiric Abx for now with mucus production in setting of COPD Exac on admit.   Daily INR (suspect non-compliance)  Replace Mg again today  CKD with mild JADA this AM labs.     TTE with EF 35%.           DVT Prophylaxis  Last Anticoag Admin            warfarin (Coumadin) tablet 5 mg    Given 5 mg at 04/08/25 1732    Frequency: Daily         No unadministered anticoagulant orders found.            SUMMARY/DISPOSITION  Improved with diuresis, Abx, and mucolytics.   If continued improvements into tomorrow, anticipate DC potential in AM.       Disclaimer: The timestamp of this note indicates when it was documented and does not necessarily correspond to the time the patient was evaluated or seen.        Stephen Tijerina, DO

## 2025-04-09 NOTE — CARE PLAN
The patient's goals for the shift include      The clinical goals for the shift include Patient will be free of falls      Problem: Pain - Adult  Goal: Verbalizes/displays adequate comfort level or baseline comfort level  Outcome: Progressing     Problem: Safety - Adult  Goal: Free from fall injury  Outcome: Progressing     Problem: Discharge Planning  Goal: Discharge to home or other facility with appropriate resources  Outcome: Progressing     Problem: Chronic Conditions and Co-morbidities  Goal: Patient's chronic conditions and co-morbidity symptoms are monitored and maintained or improved  Outcome: Progressing     Problem: Nutrition  Goal: Nutrient intake appropriate for maintaining nutritional needs  Outcome: Progressing       Over the shift, the patient did not make progress toward the following goals. Barriers to progression include

## 2025-04-10 ENCOUNTER — PHARMACY VISIT (OUTPATIENT)
Dept: PHARMACY | Facility: CLINIC | Age: 70
End: 2025-04-10
Payer: MEDICAID

## 2025-04-10 VITALS
OXYGEN SATURATION: 95 % | DIASTOLIC BLOOD PRESSURE: 84 MMHG | HEIGHT: 73 IN | WEIGHT: 249.12 LBS | BODY MASS INDEX: 33.02 KG/M2 | TEMPERATURE: 97.5 F | RESPIRATION RATE: 18 BRPM | SYSTOLIC BLOOD PRESSURE: 123 MMHG | HEART RATE: 78 BPM

## 2025-04-10 PROBLEM — A04.72 CLOSTRIDIUM DIFFICILE COLITIS: Status: RESOLVED | Noted: 2023-12-28 | Resolved: 2025-04-10

## 2025-04-10 PROBLEM — R06.02 SHORTNESS OF BREATH: Status: RESOLVED | Noted: 2025-04-07 | Resolved: 2025-04-10

## 2025-04-10 LAB
ANION GAP SERPL CALC-SCNC: 12 MMOL/L (ref 10–20)
BUN SERPL-MCNC: 54 MG/DL (ref 6–23)
CALCIUM SERPL-MCNC: 8.8 MG/DL (ref 8.6–10.3)
CHLORIDE SERPL-SCNC: 99 MMOL/L (ref 98–107)
CO2 SERPL-SCNC: 31 MMOL/L (ref 21–32)
CREAT SERPL-MCNC: 1.87 MG/DL (ref 0.5–1.3)
EGFRCR SERPLBLD CKD-EPI 2021: 38 ML/MIN/1.73M*2
ERYTHROCYTE [DISTWIDTH] IN BLOOD BY AUTOMATED COUNT: 16.9 % (ref 11.5–14.5)
GLUCOSE BLD MANUAL STRIP-MCNC: 162 MG/DL (ref 74–99)
GLUCOSE BLD MANUAL STRIP-MCNC: 165 MG/DL (ref 74–99)
GLUCOSE BLD MANUAL STRIP-MCNC: 257 MG/DL (ref 74–99)
GLUCOSE SERPL-MCNC: 134 MG/DL (ref 74–99)
HCT VFR BLD AUTO: 36 % (ref 41–52)
HGB BLD-MCNC: 10.9 G/DL (ref 13.5–17.5)
INR PPP: 1.2 (ref 0.9–1.1)
MAGNESIUM SERPL-MCNC: 1.86 MG/DL (ref 1.6–2.4)
MCH RBC QN AUTO: 29.8 PG (ref 26–34)
MCHC RBC AUTO-ENTMCNC: 30.3 G/DL (ref 32–36)
MCV RBC AUTO: 98 FL (ref 80–100)
NRBC BLD-RTO: 0 /100 WBCS (ref 0–0)
PLATELET # BLD AUTO: 237 X10*3/UL (ref 150–450)
POTASSIUM SERPL-SCNC: 4.5 MMOL/L (ref 3.5–5.3)
PROTHROMBIN TIME: 12.9 SECONDS (ref 9.8–12.4)
RBC # BLD AUTO: 3.66 X10*6/UL (ref 4.5–5.9)
SODIUM SERPL-SCNC: 137 MMOL/L (ref 136–145)
WBC # BLD AUTO: 11.5 X10*3/UL (ref 4.4–11.3)

## 2025-04-10 PROCEDURE — 80048 BASIC METABOLIC PNL TOTAL CA: CPT | Performed by: INTERNAL MEDICINE

## 2025-04-10 PROCEDURE — 85027 COMPLETE CBC AUTOMATED: CPT | Performed by: INTERNAL MEDICINE

## 2025-04-10 PROCEDURE — 94660 CPAP INITIATION&MGMT: CPT

## 2025-04-10 PROCEDURE — 2500000004 HC RX 250 GENERAL PHARMACY W/ HCPCS (ALT 636 FOR OP/ED): Performed by: INTERNAL MEDICINE

## 2025-04-10 PROCEDURE — 85610 PROTHROMBIN TIME: CPT | Performed by: INTERNAL MEDICINE

## 2025-04-10 PROCEDURE — 36415 COLL VENOUS BLD VENIPUNCTURE: CPT | Performed by: INTERNAL MEDICINE

## 2025-04-10 PROCEDURE — 99239 HOSP IP/OBS DSCHRG MGMT >30: CPT | Performed by: INTERNAL MEDICINE

## 2025-04-10 PROCEDURE — 2500000001 HC RX 250 WO HCPCS SELF ADMINISTERED DRUGS (ALT 637 FOR MEDICARE OP): Performed by: INTERNAL MEDICINE

## 2025-04-10 PROCEDURE — 94640 AIRWAY INHALATION TREATMENT: CPT

## 2025-04-10 PROCEDURE — RXMED WILLOW AMBULATORY MEDICATION CHARGE

## 2025-04-10 PROCEDURE — 82947 ASSAY GLUCOSE BLOOD QUANT: CPT

## 2025-04-10 PROCEDURE — 2500000002 HC RX 250 W HCPCS SELF ADMINISTERED DRUGS (ALT 637 FOR MEDICARE OP, ALT 636 FOR OP/ED): Performed by: INTERNAL MEDICINE

## 2025-04-10 PROCEDURE — 83735 ASSAY OF MAGNESIUM: CPT | Performed by: INTERNAL MEDICINE

## 2025-04-10 RX ORDER — PREDNISONE 20 MG/1
20 TABLET ORAL DAILY
Qty: 5 TABLET | Refills: 0 | Status: SHIPPED | OUTPATIENT
Start: 2025-04-10 | End: 2025-04-15

## 2025-04-10 RX ORDER — WARFARIN SODIUM 5 MG/1
TABLET ORAL
Qty: 60 TABLET | Refills: 0 | Status: SHIPPED | OUTPATIENT
Start: 2025-04-10

## 2025-04-10 RX ORDER — FUROSEMIDE 20 MG/1
20 TABLET ORAL DAILY
Qty: 30 TABLET | Refills: 0 | Status: SHIPPED | OUTPATIENT
Start: 2025-04-10 | End: 2025-05-10

## 2025-04-10 RX ADMIN — IPRATROPIUM BROMIDE AND ALBUTEROL SULFATE 3 ML: .5; 3 SOLUTION RESPIRATORY (INHALATION) at 08:11

## 2025-04-10 RX ADMIN — PREDNISONE 40 MG: 20 TABLET ORAL at 08:31

## 2025-04-10 RX ADMIN — INSULIN HUMAN 2 UNITS: 100 INJECTION, SOLUTION PARENTERAL at 13:41

## 2025-04-10 RX ADMIN — INSULIN HUMAN 2 UNITS: 100 INJECTION, SOLUTION PARENTERAL at 08:51

## 2025-04-10 RX ADMIN — GUAIFENESIN 1200 MG: 600 TABLET, EXTENDED RELEASE ORAL at 08:31

## 2025-04-10 RX ADMIN — BUDESONIDE 0.5 MG: 0.5 INHALANT ORAL at 08:11

## 2025-04-10 RX ADMIN — BISOPROLOL FUMARATE 5 MG: 5 TABLET ORAL at 08:31

## 2025-04-10 RX ADMIN — ISOSORBIDE MONONITRATE 30 MG: 30 TABLET, EXTENDED RELEASE ORAL at 08:31

## 2025-04-10 RX ADMIN — PANTOPRAZOLE SODIUM 40 MG: 40 TABLET, DELAYED RELEASE ORAL at 05:32

## 2025-04-10 RX ADMIN — LEVOTHYROXINE SODIUM 50 MCG: 0.05 TABLET ORAL at 05:32

## 2025-04-10 RX ADMIN — FLUTICASONE PROPIONATE 2 SPRAY: 50 SPRAY, METERED NASAL at 08:32

## 2025-04-10 RX ADMIN — ATORVASTATIN CALCIUM 40 MG: 40 TABLET, FILM COATED ORAL at 08:31

## 2025-04-10 RX ADMIN — GABAPENTIN 300 MG: 300 CAPSULE ORAL at 08:31

## 2025-04-10 ASSESSMENT — COGNITIVE AND FUNCTIONAL STATUS - GENERAL
WALKING IN HOSPITAL ROOM: A LITTLE
HELP NEEDED FOR BATHING: A LITTLE
TOILETING: A LITTLE
STANDING UP FROM CHAIR USING ARMS: A LITTLE
DRESSING REGULAR LOWER BODY CLOTHING: A LITTLE
PERSONAL GROOMING: A LITTLE
DAILY ACTIVITIY SCORE: 19
TURNING FROM BACK TO SIDE WHILE IN FLAT BAD: A LITTLE
MOVING TO AND FROM BED TO CHAIR: A LITTLE
CLIMB 3 TO 5 STEPS WITH RAILING: A LITTLE
DRESSING REGULAR UPPER BODY CLOTHING: A LITTLE
MOBILITY SCORE: 18
MOVING FROM LYING ON BACK TO SITTING ON SIDE OF FLAT BED WITH BEDRAILS: A LITTLE

## 2025-04-10 ASSESSMENT — PAIN SCALES - GENERAL: PAINLEVEL_OUTOF10: 0 - NO PAIN

## 2025-04-10 NOTE — NURSING NOTE
Met with patient at the bedside, and wife was on speaker phone.  Discharge Planning discussed. AVS updated with follow-ups, education, and medication information. Verified/ entered a PCP and pharmacy. New medications and side effects discussed. Discussed follow up appointments. All questions answered.  Updated nurse on this info. AVS printed and hi-lighted. IV and tele removed. Wife scheduled Provide a Ride fro 6pm.

## 2025-04-10 NOTE — HOSPITAL COURSE
69 y.o. male with a PMHx of atrial flutter on Coumadin, HTN, HLD, CHF , Active smoker ,COPD, DM, ,PAD, right toe amputation, morbid obesity, diabetic neuropathy, GERD, Obstructive sleep apnea, CPAP , gout, hypothyroidism, CAD who presented to [facility] on 4/7/2025 with a chief complaint of shortness of breath.  Patient was accompanied by his wife who helped with the history.  Patient was having progressive shortness of breath, the became worse over the last 4 days.  He was rapidly breathing and uncomfortable according to his wife.  He slept only for 8 hours over the last week due to his shortness of breath and he was sitting in the recliner.  On the day of admission, his wife felt that he was disoriented and heavily breathing.  He was having cough, but was not coming up with phlegm, he was having chills which is his baseline, felt warm but no measured fever. He was having mild chest discomfort.  He had nausea and was gagging, vomited once. She stated he has LE edema.  He denies any headaches, change in vision, difficulty swallowing, change in hearing,  abdominal pain, weight change, change in bowel habits/urine, joint pain/swelling, weakness in any of the extremities. He lives with his wife  , he is compliant with his medications and feels safe at home.     H/o Noncompliance with his medications as per chart review from recent cardiology note in March.   IV lasix initiated.    O2 weaned as tolerated during stay.   Questionable infiltrate on imaging, however, no significant fevers/chills.  Suspect early mild CAP likely and will cont short course of Abx.    Also, given antibiotics during stay due to COPD Exacerbation with increased sputum production.  Diuresis adjusted as tolerated by renal function and BP during stay.   Bronchodilators and mucolytics given as well.   Patient overall improved with diuresis and pulmonary hygiene during stay.   Discussed importance of medication compliance given findings of document review.

## 2025-04-10 NOTE — NURSING NOTE
"Heart Failure Nurse Navigator      Assessment    I met with Rufino Obrien at the bedside    Patients Cardiologist(s): cannot remember the name at this time    Patients Primary Care Provider:    1. Medical Domain  What is the patient's most recent LVEF? 35%  HFrEF (LVEF <= 40%) Quadruple therapy recommended  HFmrEF (LVEF 41-49%) Quadruple therapy recommended  HFpEF (LVEF >= 50%) Minimum recommendations: SGLT2i and MRA  Is the patient on OP GDMT for their condition?   ARNI/ACEI/ARB: Yes Lisinopril 20 mg daily  BB: Yes Bisoprolol 5 mg daily  MRA: No None  SGLT2i: No None  Is the patient prescribed a diuretic? No  None  Does this patient have an implanted device (ie cardioMEMS, ICD, CRT-D)?  Device type:   Could this patient have advanced heart failure (Stage D heart failure)?: No   If yes, the potential markers of advanced heart failure include:     REFERENCE: Potential markers of advanced HF   Inotrope (dobutamine or milrinone) used during this admission?   LVEF<=25%?   2.   >=2 hospitalizations for ADHF in the last year?   3.   Severe symptoms of HF (fatigue, dyspnea, confusion, edema) despite medical therapy?   4.  Downtitration of GDMT as compared to home medications?   5.  Discontinuation of GDMT because of hypotension or renal intolerance?   6.  Recurrent arrhythmias (AF, VT with ICD shocks)?   7.  Cardiac cachexia (i.e., unintentional weight loss due to HF)?   8.  High-risk biomarker profile (e.g., hyponatremia [Na<135], very elevated BNP, worsening kidney function)   9.  Escalating doses of diuretics or persistent edema despite escalation     2. Mind and Emotion  Does this patient have possible cognitive impairment?: No (The Mini-Cog score )  Ask the patient to memorize these 3 words: banana, sunrise, chair  Ask the patient to draw a clock with hands pointing at \"20 minutes after 8\"  Ask the patient to recall the 3 words  Score: Add number of words recalled + clock drawing (0 points for any errors, 2 points " if correct)  Interpretation: A score of 0-2 suggests cognitive impairment is present, a score of 3-5 suggests cognitive impairment is absent  Does this patient have major depression?: No (PH-Q2 score )  Over the last 2 weeks: Little interest or pleasure in doing things? (Not at all +0, Several days +1, More than half the days +2, Nearly every day +3)  Over the last 2 weeks: Feeling down, depressed or hopeless? (Not at all +0, Several days +1, More than half the days +2, Nearly every day +3)  Score: Add points  Interpretation: A score of 3 or more suggests that major depression is likely.     3. Physical Function  Could this patient be frail?: No   Defined by presence of all of these: slowness, weakness, shrinking, inactivity, exhaustion  Is this patient at risk for falling?: No   Defined by having experienced a fall in the last 12 months.    4. Social Determinants of Health  Transportation deficits?: No   Lack of insurance?: No   Living conditions (homelessness, unstable home)?: No   Poor family/social support?: No     I provided the following heart failure education:  - Heart Failure education packet provided.  - HF signs and symptoms, heart failure zones and when to call cardiologist.   - Controlling Heart Failure at Home: medication adherence, following up with cardiologist at least once yearly, staying healthy and active, limiting sodium and fluid intake as directed by cardiologist.  - Daily Weight Education  -Low Sodium Diet Education  -Fluid Restriction Education      *Discharge Appointment Follow-up Plan:        Additional Comments: Wife, Sheela, at bedside and participated in teaching. Discussed CHF signs and symptoms and when to call cardiologist. Discussed the importance of following a low sodium diet and fluid restriction to prevent fluid retention. Discussed reading food labels and foods to avoid including fast foods and processed foods. Discussed the importance of medication compliance. Patient and wife  appreciated education and verbalized understanding of teaching provided.

## 2025-04-10 NOTE — DISCHARGE INSTRUCTIONS
HEART FAILURE DISCHARGE INSTRUCTIONS:  1. Weigh yourself daily and record on your weight log.  2. If you gain more than 2 or 3 pounds overnight, call your cardiologist.  3. Follow a low sodium diet. No more than 2000 mg in one day, or more than 650 mg per meal.  4. Limit total fluids to no more than 8 cups (or 2 liters) per day - this includes all fluids (water, coffee, juice, milk, tea, etc.)  5. Monitor your blood pressure daily and record on your weight log.  6. Call to schedule your follow-up appointments when you get home if they were not already scheduled for you.  7. Keep your follow-up appointments! Bring your weight log with you so the doctors can see your weight trend and blood pressure readings.  8. Be sure to  any new prescriptions and take them as directed. If unsure of the medications, be sure to call your cardiologist.  9. Stay as active as you can tolerate.   10. If you notice subtle change of symptoms (slight increase in swelling, slight shortness of breath, a new intolerance to laying flat, a new cough), be sure to call your cardiologist.

## 2025-04-10 NOTE — DISCHARGE SUMMARY
Discharge Diagnosis  Shortness of breath    Issues Requiring Follow-Up      Test Results Pending At Discharge  Pending Labs       No current pending labs.            Hospital Course  69 y.o. male with a PMHx of atrial flutter on Coumadin, HTN, HLD, CHF , Active smoker ,COPD, DM, ,PAD, right toe amputation, morbid obesity, diabetic neuropathy, GERD, Obstructive sleep apnea, CPAP , gout, hypothyroidism, CAD who presented to [facility] on 4/7/2025 with a chief complaint of shortness of breath.  Patient was accompanied by his wife who helped with the history.  Patient was having progressive shortness of breath, the became worse over the last 4 days.  He was rapidly breathing and uncomfortable according to his wife.  He slept only for 8 hours over the last week due to his shortness of breath and he was sitting in the recliner.  On the day of admission, his wife felt that he was disoriented and heavily breathing.  He was having cough, but was not coming up with phlegm, he was having chills which is his baseline, felt warm but no measured fever. He was having mild chest discomfort.  He had nausea and was gagging, vomited once. She stated he has LE edema.  He denies any headaches, change in vision, difficulty swallowing, change in hearing,  abdominal pain, weight change, change in bowel habits/urine, joint pain/swelling, weakness in any of the extremities. He lives with his wife  , he is compliant with his medications and feels safe at home.     H/o Noncompliance with his medications as per chart review from recent cardiology note in March.   IV lasix initiated.    O2 weaned as tolerated during stay.   Questionable infiltrate on imaging, however, no significant fevers/chills.  Suspect early mild CAP likely and will cont short course of Abx.    Also, given antibiotics during stay due to COPD Exacerbation with increased sputum production.  Diuresis adjusted as tolerated by renal function and BP during stay.   Bronchodilators and  mucolytics given as well.   Patient overall improved with diuresis and pulmonary hygiene during stay.   Discussed importance of medication compliance given findings of document review.      Pertinent Physical Exam At Time of Discharge    GENERAL:   no distress, alert and cooperative  HEENT: Normal Inspection, Mucous membranes moist, No JVD, No Lymphadenopathy  CARDIOVASCULAR: RRR, no murmurs, 2+ equal pulses of the extremities, normal S1 and S 2  RESPIRATORY: Patent airways, CTAB, normal breath sounds with good chest expansion, thorax symmetric, No Wheezes, Rales or Rhonchi  ABDOMEN: Soft, Non-Tender, Normal Bowel Sounds, No Distention  SKIN: Warm and dry, no lesions, no rashes  EXTREMITIES: normal extremities, no cyanosis edema, contusions or wounds, no clubbing  NEURO: A&O x 3, CN II-XII grossly intact  PSYCH: Appropriate mood and behavior      Home Medications     Medication List      START taking these medications     furosemide 20 mg tablet; Commonly known as: Lasix; Take 1 tablet (20 mg)   by mouth once daily.   predniSONE 20 mg tablet; Commonly known as: Deltasone; Take 1 tablet (20   mg) by mouth once daily for 5 days.     CHANGE how you take these medications     docusate sodium 100 mg capsule; Commonly known as: Colace; What changed:   See the new instructions.     CONTINUE taking these medications     * albuterol 90 mcg/actuation inhaler   * albuterol 2.5 mg /3 mL (0.083 %) nebulizer solution   allopurinol 300 mg tablet; Commonly known as: Zyloprim; Take 1 tablet   (300 mg) by mouth once daily.   ammonium lactate 12 % lotion; Commonly known as: Lac-Hydrin   atorvastatin 40 mg tablet; Commonly known as: Lipitor; Take 1 tablet (40   mg) by mouth once daily.   azelastine 205.5 mcg (0.15 %) spray,non-aerosol   bisoprolol 5 mg tablet; Commonly known as: Zebeta; Take 1 tablet (5 mg)   by mouth once daily.   cyanocobalamin 500 mcg tablet; Commonly known as: Vitamin B-12; Take 1   tablet (500 mcg) by mouth once  daily.   dulaglutide 3 mg/0.5 mL injection; Commonly known as: Trulicity; Inject   3 mg under the skin 1 (one) time per week.   Dulera 200-5 mcg/actuation inhaler; Generic drug: mometasone-formoterol;   Inhale 2 puffs 2 times a day.   ergocalciferol 1250 mcg (50,000 units) capsule; Commonly known as:   Vitamin D-2; Take 1 capsule (50,000 Units) by mouth 1 (one) time per week.   ezetimibe 10 mg tablet; Commonly known as: Zetia; Take 1 tablet (10 mg)   by mouth once daily at bedtime.   fluticasone 50 mcg/actuation nasal spray; Commonly known as: Flonase   gabapentin 300 mg capsule; Commonly known as: Neurontin; Take 1 capsule   (300 mg) by mouth 3 times a day.   ipratropium-albuteroL 0.5-2.5 mg/3 mL nebulizer solution; Commonly known   as: Duo-Neb   isosorbide mononitrate ER 30 mg 24 hr tablet; Commonly known as: Imdur;   Take 1 tablet (30 mg) by mouth once daily.   levothyroxine 50 mcg tablet; Commonly known as: Synthroid, Levoxyl; Take   1 tablet (50 mcg) by mouth once daily. as directed   lisinopriL-hydrochlorothiazide 20-12.5 mg tablet; Take 1 tablet by mouth   once daily.   nitroglycerin 0.4 mg SL tablet; Commonly known as: Nitrostat   omeprazole 20 mg DR capsule; Commonly known as: PriLOSEC; Take 1 capsule   (20 mg) by mouth once daily.   prothrombin time/INR test metr misc; 1 kit every 14 (fourteen) days.   tobramycin 0.3 % ophthalmic solution; Commonly known as: Tobrex   warfarin 5 mg tablet; Commonly known as: Coumadin; Take as directed. If   you are unsure how to take this medication, talk to your nurse or doctor.;   Original instructions: Take 2 tablets daily as directed by coumadin clinic  * This list has 2 medication(s) that are the same as other medications   prescribed for you. Read the directions carefully, and ask your doctor or   other care provider to review them with you.       Outpatient Follow-Up  No future appointments.    Stephen Tijerina, DO

## 2025-04-11 ENCOUNTER — PATIENT OUTREACH (OUTPATIENT)
Dept: PRIMARY CARE | Facility: CLINIC | Age: 70
End: 2025-04-11
Payer: COMMERCIAL

## 2025-04-11 NOTE — PROGRESS NOTES
Discharge Facility:  [facility]     Discharge Diagnosis:  SOB  CHF  Admission Date:  4/8/25  Discharge Date:   4/10/25    PCP Appointment Date:  TBD-I am unable to make an appt due to no openings . Message sent to office staff requesting assistance.    Specialist Appointment Date:   TBD -Follow up with pulmonary -Dr Rothman   TBD-Cardio-Dr Otero  Hospital Encounter and Summary Linked: Yes  ED to Hosp-Admission (Discharged) with Stephen Tijerina DO; Breezy Mayer DO (04/07/2025)     Nursing Note by Soledad Tomas, RN (04/09/2025 17:30)     Discharge Summary by Stephen Tijerina DO (04/10/2025 11:53)   See discharge assessment below for further details  Wrap Up  Wrap Up Additional Comments: Successful transition of care outreach within 2 business days of discharge. CM introduced myself and the TCM program.   CM gave my contact information and encouraged to call if needing assistance or has any further non-emergent questions prior to my next outreach. Wife confirmed that she was present with Heart Failure nurse met with Olu. She is aware of her discharge suggest/instructions. She will keep a log of BP & pulse and take to appt. She is unable to weight him since he can not currently stand.  Wife aware that PCP office will be reaching out for follow up appt and she is to call pulmonary for appt. Wife declined cardio scheduling at this time stated that she was not told to see Cardio right now, Olu just saw Dr Otero a few weeks ago. I suggested she still check with Dr Ji at follow up as to how soon to see Cardio. Sheela thanked me for call and agrees with plan.   Reviewed hospital stay and answered any questions. Caregiver denies any further discharge questions/needs at this time. (4/11/2025 10:53 AM)    Medications  Medications reviewed with patient/caregiver?: Yes (spoke with wife) (4/11/2025 10:53 AM)  Is the patient having any side effects they believe may be caused by any  medication additions or changes?: No (4/11/2025 10:53 AM)  Does the patient have all medications ordered at discharge?: Yes (4/11/2025 10:53 AM)  Care Management Interventions: Provided patient education (4/11/2025 10:53 AM)  Prescription Comments: START taking: furosemide (Lasix) predniSONE (Deltasone)  CHANGE how you take: warfarin (Coumadin) (4/11/2025 10:53 AM)  Is the patient taking all medications as directed (includes completed medication regime)?: Yes (4/11/2025 10:53 AM)  Care Management Interventions: Provided patient education (4/11/2025 10:53 AM)  Medication Comments: Reminded to bring all medications to future appointments.Caregiver endorses understanding & compliance with medications. (4/11/2025 10:53 AM)    Appointments  Does the patient have a primary care provider?: Yes (4/11/2025 10:53 AM)  Care Management Interventions: Educated patient on importance of making appointment; Advised patient to make appointment (4/11/2025 10:53 AM)  Has the patient kept scheduled appointments due by today?: Yes (4/11/2025 10:53 AM)  Care Management Interventions: Advised to schedule with specialist (4/11/2025 10:53 AM)    Self Management  What is the home health agency?: na (4/11/2025 10:53 AM)  What Durable Medical Equipment (DME) was ordered?: na (4/11/2025 10:53 AM)    Patient Teaching  Does the patient have access to their discharge instructions?: No (system was down yesterday so did not get summary at discharge- We verbally reviewed today) (4/11/2025 10:53 AM)  Care Management Interventions: Reviewed instructions with patient (4/11/2025 10:53 AM)  What is the patient's perception of their health status since discharge?: Improving (4/11/2025 10:53 AM)  Is the patient/caregiver able to teach back the hierarchy of who to call/visit for symptoms/problems? PCP, Specialist, Home Health nurse, Urgent Care, ED, 911: Yes (4/11/2025 10:53 AM)

## 2025-04-15 NOTE — DOCUMENTATION CLARIFICATION NOTE
"    PATIENT:               MARISSA AUGUST  ACCT #:                  [id number]  MRN:                       [id number]  :                       1955  ADMIT DATE:       2025 6:11 PM  DISCH DATE:        4/10/2025 5:44 PM  RESPONDING PROVIDER #:        [id number]          PROVIDER RESPONSE TEXT:    Sepsis with cardiac organ dysfunction of Type 2 MI    CDI QUERY TEXT:    Clarification    Instruction:    Based on your assessment of the patient and the clinical information, please provide the requested documentation by clicking on the appropriate radio button and enter any additional information if prompted.    Question: Please further clarify if a relationship exists between the Sepsis and acute organ dysfunction    When answering this query, please exercise your independent professional judgment. The fact that a question is being asked, does not imply that any particular answer is desired or expected.    The patient's clinical indicators include:  Clinical Information:  * 69 year old male admitted with chest pain- SOB and PNA    Clinical Indicators:  * 25 H&P notes \"Sepsis 2/: tachycardia and tachypnea\" - \"NSTEMI type II\"   1814- VS 35.8-569-/102-96 % sat on 3 L n/c- WBC 8.6- Troponin 48-48- and 25 troponin 49    Treatment:  * 25- 25 IV Zithromax 500 mg q 24 hr  * 25 - 25 Rocephin 2 g IV q24 hrs    Risk Factors:  * PNA -  Options provided:  -- Sepsis with cardiac organ dysfunction of Type 2 MI  -- Sepsis with other organ dysfunction, Please specify sepsis associated organ dysfunction below  -- Other - I will add my own diagnosis  -- Refer to Clinical Documentation Reviewer    Query created by: Sheela Brunson on 4/15/2025 7:40 AM      Electronically signed by:  MANNY MARIE DO 4/15/2025 10:23 AM          "

## 2025-04-24 ENCOUNTER — PATIENT OUTREACH (OUTPATIENT)
Dept: PRIMARY CARE | Facility: CLINIC | Age: 70
End: 2025-04-24
Payer: COMMERCIAL

## 2025-04-24 NOTE — PROGRESS NOTES
Confirmation of at least 2 patient identifiers.    Completed telephonic follow-up with patient approximately 14 days post discharge, no PCP follow up as of note.  Pt has upcoming appt with Dr Ji on 5/5/25. Transportation is already scheduled per wife.     Spoke to Spouse/significant other during outreach call.    Patient reports feeling: Improved    Patient has questions or concerns about medications: No    Have all prescribed medications been filled? Yes    Patient has necessary resources to manage their care? Yes    Patient has questions or concerns? No    Next care management follow-up approximately within one month.  Care  information provided to patient.    Discussed with Wife that when I checked with Dr Ji he confirmed as I suggested , he wants Olu to follow up with Dr Otero even though he had just saw her prior to admission. Wife will call to get that appt set up and work with transportation company for ride.

## 2025-05-05 ENCOUNTER — APPOINTMENT (OUTPATIENT)
Dept: PRIMARY CARE | Facility: CLINIC | Age: 70
End: 2025-05-05
Payer: COMMERCIAL

## 2025-05-05 VITALS — SYSTOLIC BLOOD PRESSURE: 105 MMHG | HEART RATE: 49 BPM | TEMPERATURE: 97.2 F | DIASTOLIC BLOOD PRESSURE: 73 MMHG

## 2025-05-05 DIAGNOSIS — I48.21 PERMANENT ATRIAL FIBRILLATION (MULTI): ICD-10-CM

## 2025-05-05 DIAGNOSIS — Z91.199 NONCOMPLIANCE WITH TREATMENT: ICD-10-CM

## 2025-05-05 DIAGNOSIS — E11.65 TYPE 2 DIABETES MELLITUS WITH HYPERGLYCEMIA, WITHOUT LONG-TERM CURRENT USE OF INSULIN: ICD-10-CM

## 2025-05-05 DIAGNOSIS — I50.9 ACUTE ON CHRONIC CONGESTIVE HEART FAILURE, UNSPECIFIED HEART FAILURE TYPE: Primary | ICD-10-CM

## 2025-05-05 DIAGNOSIS — N18.30 CKD STAGE 3 SECONDARY TO DIABETES (MULTI): ICD-10-CM

## 2025-05-05 DIAGNOSIS — R79.89 LOW VITAMIN B12 LEVEL: ICD-10-CM

## 2025-05-05 DIAGNOSIS — E11.22 CKD STAGE 3 SECONDARY TO DIABETES (MULTI): ICD-10-CM

## 2025-05-05 DIAGNOSIS — Z12.5 PROSTATE CANCER SCREENING: ICD-10-CM

## 2025-05-05 DIAGNOSIS — E78.2 MIXED HYPERLIPIDEMIA: ICD-10-CM

## 2025-05-05 DIAGNOSIS — Z09 HOSPITAL DISCHARGE FOLLOW-UP: ICD-10-CM

## 2025-05-05 DIAGNOSIS — I10 BENIGN ESSENTIAL HTN: ICD-10-CM

## 2025-05-05 DIAGNOSIS — D64.9 ANEMIA, UNSPECIFIED TYPE: ICD-10-CM

## 2025-05-05 DIAGNOSIS — E55.9 VITAMIN D DEFICIENCY: ICD-10-CM

## 2025-05-05 PROCEDURE — 1111F DSCHRG MED/CURRENT MED MERGE: CPT | Performed by: INTERNAL MEDICINE

## 2025-05-05 PROCEDURE — 99214 OFFICE O/P EST MOD 30 MIN: CPT | Performed by: INTERNAL MEDICINE

## 2025-05-05 PROCEDURE — 1159F MED LIST DOCD IN RCRD: CPT | Performed by: INTERNAL MEDICINE

## 2025-05-05 PROCEDURE — 3074F SYST BP LT 130 MM HG: CPT | Performed by: INTERNAL MEDICINE

## 2025-05-05 PROCEDURE — 3078F DIAST BP <80 MM HG: CPT | Performed by: INTERNAL MEDICINE

## 2025-05-05 PROCEDURE — 1158F ADVNC CARE PLAN TLK DOCD: CPT | Performed by: INTERNAL MEDICINE

## 2025-05-05 PROCEDURE — 1123F ACP DISCUSS/DSCN MKR DOCD: CPT | Performed by: INTERNAL MEDICINE

## 2025-05-05 PROCEDURE — 1160F RVW MEDS BY RX/DR IN RCRD: CPT | Performed by: INTERNAL MEDICINE

## 2025-05-05 RX ORDER — FUROSEMIDE 20 MG/1
20 TABLET ORAL EVERY OTHER DAY
Qty: 45 TABLET | Refills: 1 | Status: SHIPPED | OUTPATIENT
Start: 2025-05-05 | End: 2025-11-01

## 2025-05-05 ASSESSMENT — ENCOUNTER SYMPTOMS
ABDOMINAL PAIN: 0
SORE THROAT: 0
ARTHRALGIAS: 1
DIZZINESS: 0
WEAKNESS: 1
LIGHT-HEADEDNESS: 0
VOMITING: 0
FATIGUE: 1
NAUSEA: 0
CHILLS: 0
SHORTNESS OF BREATH: 0
DIARRHEA: 0
CONSTIPATION: 0
AGITATION: 0
CONFUSION: 0
DYSURIA: 0
BLOOD IN STOOL: 0
FEVER: 0
COUGH: 0
PALPITATIONS: 0

## 2025-05-05 NOTE — PROGRESS NOTES
Subjective   Patient ID: Rufino Obrien is a 69 y.o. male who presents for Hospital Follow-up ([facility] admission follow up for pneumonia and CHF).  History of Present Illness  Rufino Obrien is a 69 year old male with congestive heart failure and atrial fibrillation who presents for a follow-up after hospitalization for heart failure exacerbation and possible pneumonia.    He was hospitalized in early April for shortness of breath, heart failure, pulmonary edema, and pneumonia. During his hospital stay, he was treated with antibiotics and Lasix, which significantly reduced the fluid. He was hospitalized for about three to four days. Since discharge, he feels as though there is still fluid in his legs, causing discomfort. He was prescribed a temporary course of Lasix, which he believes helped him significantly, allowing him to move around better, although he still requires assistance to get in and out of bed. No fever or chills since leaving the hospital.    He is on thyroid medication and has a history of atrial fibrillation, for which he was previously on Coumadin. However, he has not been taking it consistently due to issues with getting refills and lab checks. He has a history of congestive heart failure and was noted to have a BNP of 814 during his hospital stay, indicating heart failure.    He has not seen a nephrologist recently but has a history of seeing one. His kidney function has been variable, with recent creatinine levels around 1.8 during his hospital stay.    I did review the patient's discharge summary Patient had presented to [facility] on 4/7/2025 with chief complaint of shortness of breath.  Patient was having progressive shortness of breath over a period of 4 days that became worse that included rapid breathing uncomfortable according to his wife.  He was having cough but without phlegm was having chills at that time but no fever.  He had nausea and gagging and vomited  once was complaining of lower extremity edema.  Has a history of noncompliance with medications based upon chart review from recent cardiology note in March.  Was given IV Lasix in the hospital oxygen was weaned during his stay there was questionable infiltrate on his imaging and early mild community-acquired pneumonia was suspected likely and he continued a short course of antibiotics and was also given these antibiotics due to COPD exacerbation and increased phlegm production.  His diuresis was adjusted as tolerated by renal function and blood pressure during his stay and he was given bronchodilators and mucolytic's.  He improved with diuresis and pulmonary hygiene.  Was given a 30-day course of Lasix 20 mg daily and prednisone for 5 days.  We discussed the importance of getting follow-up blood work.    With his cardiologist today and he would be a candidate for Eliquis since he does not have a mechanical heart valve.    Review of Systems   Constitutional:  Positive for fatigue. Negative for chills and fever.   HENT:  Negative for sore throat.    Eyes:  Negative for visual disturbance.   Respiratory:  Negative for cough and shortness of breath.    Cardiovascular:  Positive for leg swelling. Negative for chest pain and palpitations.   Gastrointestinal:  Negative for abdominal pain, blood in stool, constipation, diarrhea, nausea and vomiting.   Genitourinary:  Negative for dysuria.   Musculoskeletal:  Positive for arthralgias.   Skin:  Negative for rash.   Neurological:  Positive for weakness. Negative for dizziness, syncope and light-headedness.   Psychiatric/Behavioral:  Negative for agitation and confusion.        Objective     /73 (BP Location: Right arm, Patient Position: Sitting, BP Cuff Size: Large adult)   Pulse (!) 49   Temp 36.2 °C (97.2 °F)      Physical Exam  Vitals and nursing note reviewed.   Constitutional:       General: He is not in acute distress.     Appearance: Normal appearance. He is  obese. He is ill-appearing (Chronically ill-appearing). He is not toxic-appearing or diaphoretic.   HENT:      Head: Normocephalic and atraumatic.      Mouth/Throat:      Mouth: Mucous membranes are moist.      Pharynx: Oropharynx is clear. No oropharyngeal exudate.   Eyes:      Pupils: Pupils are equal, round, and reactive to light.   Cardiovascular:      Rate and Rhythm: Normal rate and regular rhythm.      Heart sounds: Normal heart sounds.   Pulmonary:      Effort: Pulmonary effort is normal. No respiratory distress.      Breath sounds: Normal breath sounds. No wheezing or rhonchi.   Abdominal:      General: There is no distension.      Palpations: Abdomen is soft. There is no mass.      Tenderness: There is no abdominal tenderness. There is no guarding.   Musculoskeletal:      Cervical back: Neck supple.      Right lower leg: Edema present.      Left lower leg: Edema present.   Lymphadenopathy:      Cervical: No cervical adenopathy.   Skin:     General: Skin is warm and dry.      Coloration: Skin is not jaundiced or pale.      Findings: No rash.   Neurological:      General: No focal deficit present.      Mental Status: He is alert and oriented to person, place, and time.      Cranial Nerves: No cranial nerve deficit.   Psychiatric:         Mood and Affect: Mood normal.         Behavior: Behavior normal.         Thought Content: Thought content normal.         Judgment: Judgment normal.        Physical Exam  CHEST: Lungs clear to auscultation.    Assessment & Plan  Congestive heart failure  Recent hospitalization for heart failure with pulmonary edema and pneumonia. Currently experiencing peripheral edema, likely due to fluid retention. Lasix provided relief during hospitalization. Concerns about renal function with continued Lasix use. Discussed risks of renal impairment and dehydration with Lasix. Proposed using Lasix every other day to manage fluid without significantly impacting renal function.  - Prescribe  Lasix every other day to manage fluid retention.  - Monitor renal function and potassium levels with lab tests in two weeks.  - Consider home phlebotomy for lab draws.  - Monitor for signs of dehydration and hypotension.    Gait disturbance/difficulty walking  Difficulty ambulating due to leg swelling and weakness. Improvement noted with Lasix during hospitalization.  - Evaluate need for physical therapy if mobility does not improve.    Atrial fibrillation  Atrial fibrillation managed with anticoagulation. Recent issues with INR monitoring and Coumadin management. Discussed switching to Eliquis to avoid frequent INR checks. Consideration of renal dosing due to renal function. Discussed potential cost issues and consulting a pharmacist for assistance.  - Discontinue Coumadin.  - Initiate Eliquis for anticoagulation, considering renal dosing.  - Consult  pharmacist if cost issues arise with Eliquis.  - Monitor for bleeding complications.    Hypothyroidism  Currently on thyroid medication. No recent thyroid function tests conducted during hospitalization.        Results  LABS  BMP: 814 (04/2025)    Problem List Items Addressed This Visit       Permanent atrial fibrillation (Multi)    Relevant Medications    apixaban (Eliquis) 5 mg tablet    Other Relevant Orders    Protime-INR    Anemia    Relevant Orders    Ferritin    Iron and TIBC    Vitamin B12    Benign essential HTN    Relevant Orders    Basic metabolic panel    CKD stage 3 secondary to diabetes (Multi)    Relevant Orders    Uric acid    Congestive heart failure    Relevant Medications    furosemide (Lasix) 20 mg tablet    Diabetes (Multi)    Relevant Orders    Hemoglobin A1C    Albumin-Creatinine Ratio, Urine Random    Hyperlipidemia    Relevant Orders    Basic metabolic panel    Lipid panel    Vitamin D deficiency    Relevant Orders    Vitamin D 25-Hydroxy,Total (for eval of Vitamin D levels)    Prostate cancer screening    Relevant Orders    Prostate  Spec.Ag,Screen    Low vitamin B12 level    Relevant Orders    Vitamin B12    Hospital discharge follow-up - Primary         Bradford Ji DO     This medical note was created with the assistance of artificial intelligence (AI) for documentation purposes. The content has been reviewed and confirmed by the healthcare provider for accuracy and completeness. Patient consented to the use of audio recording and use of AI during their visit.

## 2025-06-18 ENCOUNTER — PATIENT OUTREACH (OUTPATIENT)
Dept: PRIMARY CARE | Facility: CLINIC | Age: 70
End: 2025-06-18
Payer: COMMERCIAL

## 2025-06-18 NOTE — PROGRESS NOTES
Successful outreach to patient's wife regarding hospitalization as patient continues TCM program.   At time of outreach call the patient feels as if their condition has returned to baseline since initial visit with PCP or specialist.  Questions or concerns addressed at this time with patient.   Provided contact information to patient if any further non-emergent needs arise.    Doing well besides in a lot of pain from hip. Hoping to get hip surgery scheduled soon.

## 2025-07-09 ENCOUNTER — PATIENT OUTREACH (OUTPATIENT)
Dept: PRIMARY CARE | Facility: CLINIC | Age: 70
End: 2025-07-09
Payer: COMMERCIAL

## 2025-07-09 NOTE — PROGRESS NOTES
Final call back to assess needs post discharge. Left voicemail for patient & wife. Contact information provided.

## 2025-08-19 DIAGNOSIS — I25.10 CORONARY ARTERY DISEASE INVOLVING NATIVE CORONARY ARTERY OF NATIVE HEART WITHOUT ANGINA PECTORIS: ICD-10-CM

## 2025-08-21 RX ORDER — ISOSORBIDE MONONITRATE 30 MG/1
30 TABLET, EXTENDED RELEASE ORAL DAILY
Qty: 90 TABLET | Refills: 1 | Status: SHIPPED | OUTPATIENT
Start: 2025-08-21

## 2025-10-15 ENCOUNTER — APPOINTMENT (OUTPATIENT)
Dept: PRIMARY CARE | Facility: CLINIC | Age: 70
End: 2025-10-15
Payer: COMMERCIAL

## 2025-10-20 ENCOUNTER — APPOINTMENT (OUTPATIENT)
Dept: PRIMARY CARE | Facility: CLINIC | Age: 70
End: 2025-10-20
Payer: COMMERCIAL